# Patient Record
Sex: FEMALE | Race: WHITE | URBAN - METROPOLITAN AREA
[De-identification: names, ages, dates, MRNs, and addresses within clinical notes are randomized per-mention and may not be internally consistent; named-entity substitution may affect disease eponyms.]

---

## 2022-04-13 ENCOUNTER — OUTPATIENT (OUTPATIENT)
Dept: OUTPATIENT SERVICES | Facility: HOSPITAL | Age: 62
LOS: 1 days | Discharge: HOME | End: 2022-04-13
Payer: COMMERCIAL

## 2022-04-13 DIAGNOSIS — I77.9 DISORDER OF ARTERIES AND ARTERIOLES, UNSPECIFIED: ICD-10-CM

## 2022-04-13 PROCEDURE — 93925 LOWER EXTREMITY STUDY: CPT | Mod: 26

## 2022-06-24 ENCOUNTER — INPATIENT (INPATIENT)
Facility: HOSPITAL | Age: 62
LOS: 1 days | Discharge: HOME | End: 2022-06-26
Attending: INTERNAL MEDICINE | Admitting: INTERNAL MEDICINE
Payer: COMMERCIAL

## 2022-06-24 VITALS
OXYGEN SATURATION: 97 % | SYSTOLIC BLOOD PRESSURE: 176 MMHG | HEIGHT: 60 IN | RESPIRATION RATE: 19 BRPM | WEIGHT: 145.06 LBS | HEART RATE: 78 BPM | DIASTOLIC BLOOD PRESSURE: 89 MMHG | TEMPERATURE: 98 F

## 2022-06-24 DIAGNOSIS — Z98.890 OTHER SPECIFIED POSTPROCEDURAL STATES: Chronic | ICD-10-CM

## 2022-06-24 LAB
ALBUMIN SERPL ELPH-MCNC: 4.3 G/DL — SIGNIFICANT CHANGE UP (ref 3.5–5.2)
ALP SERPL-CCNC: 113 U/L — SIGNIFICANT CHANGE UP (ref 30–115)
ALT FLD-CCNC: 25 U/L — SIGNIFICANT CHANGE UP (ref 0–41)
ANION GAP SERPL CALC-SCNC: 10 MMOL/L — SIGNIFICANT CHANGE UP (ref 7–14)
AST SERPL-CCNC: 47 U/L — HIGH (ref 0–41)
BASOPHILS # BLD AUTO: 0.05 K/UL — SIGNIFICANT CHANGE UP (ref 0–0.2)
BASOPHILS NFR BLD AUTO: 0.6 % — SIGNIFICANT CHANGE UP (ref 0–1)
BILIRUB SERPL-MCNC: 0.3 MG/DL — SIGNIFICANT CHANGE UP (ref 0.2–1.2)
BUN SERPL-MCNC: 12 MG/DL — SIGNIFICANT CHANGE UP (ref 10–20)
CALCIUM SERPL-MCNC: 11.2 MG/DL — HIGH (ref 8.5–10.1)
CHLORIDE SERPL-SCNC: 103 MMOL/L — SIGNIFICANT CHANGE UP (ref 98–110)
CO2 SERPL-SCNC: 25 MMOL/L — SIGNIFICANT CHANGE UP (ref 17–32)
CREAT SERPL-MCNC: 0.7 MG/DL — SIGNIFICANT CHANGE UP (ref 0.7–1.5)
EGFR: 99 ML/MIN/1.73M2 — SIGNIFICANT CHANGE UP
EOSINOPHIL # BLD AUTO: 0.13 K/UL — SIGNIFICANT CHANGE UP (ref 0–0.7)
EOSINOPHIL NFR BLD AUTO: 1.5 % — SIGNIFICANT CHANGE UP (ref 0–8)
GLUCOSE SERPL-MCNC: 155 MG/DL — HIGH (ref 70–99)
HCT VFR BLD CALC: 44.7 % — SIGNIFICANT CHANGE UP (ref 37–47)
HGB BLD-MCNC: 15.5 G/DL — SIGNIFICANT CHANGE UP (ref 12–16)
IMM GRANULOCYTES NFR BLD AUTO: 0.6 % — HIGH (ref 0.1–0.3)
LYMPHOCYTES # BLD AUTO: 1.79 K/UL — SIGNIFICANT CHANGE UP (ref 1.2–3.4)
LYMPHOCYTES # BLD AUTO: 20.4 % — LOW (ref 20.5–51.1)
MAGNESIUM SERPL-MCNC: 2 MG/DL — SIGNIFICANT CHANGE UP (ref 1.8–2.4)
MCHC RBC-ENTMCNC: 29.6 PG — SIGNIFICANT CHANGE UP (ref 27–31)
MCHC RBC-ENTMCNC: 34.7 G/DL — SIGNIFICANT CHANGE UP (ref 32–37)
MCV RBC AUTO: 85.5 FL — SIGNIFICANT CHANGE UP (ref 81–99)
MONOCYTES # BLD AUTO: 0.6 K/UL — SIGNIFICANT CHANGE UP (ref 0.1–0.6)
MONOCYTES NFR BLD AUTO: 6.8 % — SIGNIFICANT CHANGE UP (ref 1.7–9.3)
NEUTROPHILS # BLD AUTO: 6.14 K/UL — SIGNIFICANT CHANGE UP (ref 1.4–6.5)
NEUTROPHILS NFR BLD AUTO: 70.1 % — SIGNIFICANT CHANGE UP (ref 42.2–75.2)
NRBC # BLD: 0 /100 WBCS — SIGNIFICANT CHANGE UP (ref 0–0)
NT-PROBNP SERPL-SCNC: 2648 PG/ML — HIGH (ref 0–300)
PLATELET # BLD AUTO: 277 K/UL — SIGNIFICANT CHANGE UP (ref 130–400)
POTASSIUM SERPL-MCNC: 4.5 MMOL/L — SIGNIFICANT CHANGE UP (ref 3.5–5)
POTASSIUM SERPL-SCNC: 4.5 MMOL/L — SIGNIFICANT CHANGE UP (ref 3.5–5)
PROT SERPL-MCNC: 7.5 G/DL — SIGNIFICANT CHANGE UP (ref 6–8)
RAPID RVP RESULT: SIGNIFICANT CHANGE UP
RBC # BLD: 5.23 M/UL — SIGNIFICANT CHANGE UP (ref 4.2–5.4)
RBC # FLD: 12.9 % — SIGNIFICANT CHANGE UP (ref 11.5–14.5)
SARS-COV-2 RNA SPEC QL NAA+PROBE: SIGNIFICANT CHANGE UP
SODIUM SERPL-SCNC: 138 MMOL/L — SIGNIFICANT CHANGE UP (ref 135–146)
TROPONIN T SERPL-MCNC: 0.22 NG/ML — CRITICAL HIGH
WBC # BLD: 8.76 K/UL — SIGNIFICANT CHANGE UP (ref 4.8–10.8)
WBC # FLD AUTO: 8.76 K/UL — SIGNIFICANT CHANGE UP (ref 4.8–10.8)

## 2022-06-24 PROCEDURE — 99284 EMERGENCY DEPT VISIT MOD MDM: CPT | Mod: 25

## 2022-06-24 PROCEDURE — 93010 ELECTROCARDIOGRAM REPORT: CPT

## 2022-06-24 PROCEDURE — 71046 X-RAY EXAM CHEST 2 VIEWS: CPT | Mod: 26

## 2022-06-24 RX ORDER — FAMOTIDINE 10 MG/ML
20 INJECTION INTRAVENOUS ONCE
Refills: 0 | Status: COMPLETED | OUTPATIENT
Start: 2022-06-24 | End: 2022-06-24

## 2022-06-24 RX ORDER — NITROGLYCERIN 6.5 MG
0.4 CAPSULE, EXTENDED RELEASE ORAL
Refills: 0 | Status: DISCONTINUED | OUTPATIENT
Start: 2022-06-24 | End: 2022-06-26

## 2022-06-24 RX ADMIN — Medication 0.4 MILLIGRAM(S): at 21:47

## 2022-06-24 RX ADMIN — Medication 30 MILLILITER(S): at 21:46

## 2022-06-24 RX ADMIN — FAMOTIDINE 104 MILLIGRAM(S): 10 INJECTION INTRAVENOUS at 22:40

## 2022-06-24 NOTE — ED PROVIDER NOTE - OBJECTIVE STATEMENT
Pt is a 62 yo F h/o PVD sp LLE bypass, HTN, daily smoker.  Presenting for chest pain since yesterday.  Pain initially began as epigastric described as burning, not relieved with OTC GERD medications.  Today it has progressed and became substernal, radiating to LUE, described as pressure, exertional feeling like someone is sitting on top of her. No prior stress testing. +SOB, no diaphoresis, +vomiting x1. Went to , and got ASA x4. No previous cath/stress/echo. Non-pleuritic; no immobilization or recent surgery, personal or family h/o VTE, hemoptysis, malignancy, or hormone use. No fever/chills, rhinorrhea, sore throat, cough. No palpitations. No other complaints - no abd pain, diarrhea, dysuria, hematuria, new joint pain, FND, rash, trauma.

## 2022-06-24 NOTE — ED PROVIDER NOTE - PHYSICAL EXAMINATION
_  Vital signs reviewed; ABCs intact  GENERAL: Well nourished, comfortable  SKIN: Warm, dry  HEAD & NECK: NCAT, supple neck  EYES: EOMI, PER B/L  ENT: MMM  CARD: RRR, S1, S2; no murmurs, no rubs, no gallops  RESP: Normal respiratory effort, +decreased breath sounds bilaterally  ABD: Soft, ND, NT, no rebound, no guarding  EXT: No edema; pulses palpable distally; no calf tenderness; symmetrical calf circumferences  NEUROMSK: Grossly intact  PSYCH: AAOx3, cooperative, appropriate

## 2022-06-24 NOTE — ED PROVIDER NOTE - PROGRESS NOTE DETAILS
Resident AO: Troponin elevated to 0.22, Cardiology consulted (spoke with Dr. Alvarenga), who will come and evaluate the patient. Patient already received ASA x 4 at  prior to arrival. Feels comfortable s/p SL nitro. Will repeat ECG. CO- pt endorsed to Dr. Blayne arriaga elevated, pt already took asa PTA.  rpt ekg w/o CINDY or STD. pt w/o active CP. cards consulted and pending eval. Resident AO: Spoke with Cardio Fellow Dr. Alvarenga, patient to be admitted under Dr. Magalie KHALIL after midnight for possible cath tomorrow, trops to be repeated, and Hep drip if pain returns/worsens. Pending sign-out to the MAR.

## 2022-06-24 NOTE — ED PROVIDER NOTE - ATTENDING CONTRIBUTION TO CARE
70 yo f hx HTN, PAD  pt presents for eval of cp. pain began yesterday and burning pain. today, pain changed and is described as substernal pressure, worse w/ exertion w/ associated sob. + radiation to L arm.  + nausea and vomiting x1. no hx pt/dvt. + smoker. no previous cardiac w/u     vss  gen- NAD, aaox3  card-rrr  lungs-ctab, no wheezing or rhonchi  abd-sntnd, no guarding or rebound  neuro- full str/sensation, cn ii-xii grossly intact, normal coordination     c/f acs, will get labs, ekg, trop, cxr  likely med tele admission

## 2022-06-24 NOTE — ED ADULT NURSE NOTE - OBJECTIVE STATEMENT
Patient presents to ED with complaints of epigastric pain radiating to the chest pain since yesterday.  Pain described as burning in nature, unrelieved with OTC  medications. Denies any PMH.

## 2022-06-24 NOTE — ED PROVIDER NOTE - CLINICAL SUMMARY MEDICAL DECISION MAKING FREE TEXT BOX
pt s/o to me from Dr. Zhou- pt presenting with chest pain x2d, found to have NSTEMI. no active chest pain at this time. dw cardiology, plan for admit, cath

## 2022-06-25 LAB
A1C WITH ESTIMATED AVERAGE GLUCOSE RESULT: 6.5 % — HIGH (ref 4–5.6)
ALBUMIN SERPL ELPH-MCNC: 3.8 G/DL — SIGNIFICANT CHANGE UP (ref 3.5–5.2)
ALP SERPL-CCNC: 102 U/L — SIGNIFICANT CHANGE UP (ref 30–115)
ALT FLD-CCNC: 23 U/L — SIGNIFICANT CHANGE UP (ref 0–41)
ANION GAP SERPL CALC-SCNC: 10 MMOL/L — SIGNIFICANT CHANGE UP (ref 7–14)
APTT BLD: 64.4 SEC — HIGH (ref 27–39.2)
AST SERPL-CCNC: 49 U/L — HIGH (ref 0–41)
BASOPHILS # BLD AUTO: 0.05 K/UL — SIGNIFICANT CHANGE UP (ref 0–0.2)
BASOPHILS NFR BLD AUTO: 0.8 % — SIGNIFICANT CHANGE UP (ref 0–1)
BILIRUB SERPL-MCNC: 0.3 MG/DL — SIGNIFICANT CHANGE UP (ref 0.2–1.2)
BLD GP AB SCN SERPL QL: SIGNIFICANT CHANGE UP
BUN SERPL-MCNC: 11 MG/DL — SIGNIFICANT CHANGE UP (ref 10–20)
CALCIUM SERPL-MCNC: 10.3 MG/DL — HIGH (ref 8.5–10.1)
CHLORIDE SERPL-SCNC: 105 MMOL/L — SIGNIFICANT CHANGE UP (ref 98–110)
CHOLEST SERPL-MCNC: 236 MG/DL — HIGH
CO2 SERPL-SCNC: 24 MMOL/L — SIGNIFICANT CHANGE UP (ref 17–32)
CREAT SERPL-MCNC: 0.7 MG/DL — SIGNIFICANT CHANGE UP (ref 0.7–1.5)
EGFR: 99 ML/MIN/1.73M2 — SIGNIFICANT CHANGE UP
EOSINOPHIL # BLD AUTO: 0.15 K/UL — SIGNIFICANT CHANGE UP (ref 0–0.7)
EOSINOPHIL NFR BLD AUTO: 2.3 % — SIGNIFICANT CHANGE UP (ref 0–8)
ESTIMATED AVERAGE GLUCOSE: 140 MG/DL — HIGH (ref 68–114)
GLUCOSE SERPL-MCNC: 116 MG/DL — HIGH (ref 70–99)
HCT VFR BLD CALC: 42.5 % — SIGNIFICANT CHANGE UP (ref 37–47)
HDLC SERPL-MCNC: 33 MG/DL — LOW
HGB BLD-MCNC: 14.6 G/DL — SIGNIFICANT CHANGE UP (ref 12–16)
IMM GRANULOCYTES NFR BLD AUTO: 0.5 % — HIGH (ref 0.1–0.3)
INR BLD: 1.08 RATIO — SIGNIFICANT CHANGE UP (ref 0.65–1.3)
LIPID PNL WITH DIRECT LDL SERPL: 159 MG/DL — HIGH
LYMPHOCYTES # BLD AUTO: 1.91 K/UL — SIGNIFICANT CHANGE UP (ref 1.2–3.4)
LYMPHOCYTES # BLD AUTO: 28.8 % — SIGNIFICANT CHANGE UP (ref 20.5–51.1)
MAGNESIUM SERPL-MCNC: 2.1 MG/DL — SIGNIFICANT CHANGE UP (ref 1.8–2.4)
MCHC RBC-ENTMCNC: 29.5 PG — SIGNIFICANT CHANGE UP (ref 27–31)
MCHC RBC-ENTMCNC: 34.4 G/DL — SIGNIFICANT CHANGE UP (ref 32–37)
MCV RBC AUTO: 85.9 FL — SIGNIFICANT CHANGE UP (ref 81–99)
MONOCYTES # BLD AUTO: 0.49 K/UL — SIGNIFICANT CHANGE UP (ref 0.1–0.6)
MONOCYTES NFR BLD AUTO: 7.4 % — SIGNIFICANT CHANGE UP (ref 1.7–9.3)
NEUTROPHILS # BLD AUTO: 4.01 K/UL — SIGNIFICANT CHANGE UP (ref 1.4–6.5)
NEUTROPHILS NFR BLD AUTO: 60.2 % — SIGNIFICANT CHANGE UP (ref 42.2–75.2)
NON HDL CHOLESTEROL: 203 MG/DL — HIGH
NRBC # BLD: 0 /100 WBCS — SIGNIFICANT CHANGE UP (ref 0–0)
PHOSPHATE SERPL-MCNC: 3 MG/DL — SIGNIFICANT CHANGE UP (ref 2.1–4.9)
PLATELET # BLD AUTO: 259 K/UL — SIGNIFICANT CHANGE UP (ref 130–400)
POTASSIUM SERPL-MCNC: 3.8 MMOL/L — SIGNIFICANT CHANGE UP (ref 3.5–5)
POTASSIUM SERPL-SCNC: 3.8 MMOL/L — SIGNIFICANT CHANGE UP (ref 3.5–5)
PROT SERPL-MCNC: 6.1 G/DL — SIGNIFICANT CHANGE UP (ref 6–8)
PROTHROM AB SERPL-ACNC: 12.4 SEC — SIGNIFICANT CHANGE UP (ref 9.95–12.87)
RBC # BLD: 4.95 M/UL — SIGNIFICANT CHANGE UP (ref 4.2–5.4)
RBC # FLD: 13 % — SIGNIFICANT CHANGE UP (ref 11.5–14.5)
SODIUM SERPL-SCNC: 139 MMOL/L — SIGNIFICANT CHANGE UP (ref 135–146)
TRIGL SERPL-MCNC: 220 MG/DL — HIGH
TROPONIN T SERPL-MCNC: 0.36 NG/ML — CRITICAL HIGH
TROPONIN T SERPL-MCNC: 0.39 NG/ML — CRITICAL HIGH
TSH SERPL-MCNC: 2.21 UIU/ML — SIGNIFICANT CHANGE UP (ref 0.27–4.2)
WBC # BLD: 6.64 K/UL — SIGNIFICANT CHANGE UP (ref 4.8–10.8)
WBC # FLD AUTO: 6.64 K/UL — SIGNIFICANT CHANGE UP (ref 4.8–10.8)

## 2022-06-25 PROCEDURE — 92928 PRQ TCAT PLMT NTRAC ST 1 LES: CPT | Mod: LC

## 2022-06-25 PROCEDURE — 99222 1ST HOSP IP/OBS MODERATE 55: CPT | Mod: 57

## 2022-06-25 PROCEDURE — 93306 TTE W/DOPPLER COMPLETE: CPT | Mod: 26

## 2022-06-25 PROCEDURE — 93010 ELECTROCARDIOGRAM REPORT: CPT | Mod: 76

## 2022-06-25 PROCEDURE — 93458 L HRT ARTERY/VENTRICLE ANGIO: CPT | Mod: 26,XU

## 2022-06-25 RX ORDER — SODIUM CHLORIDE 9 MG/ML
250 INJECTION INTRAMUSCULAR; INTRAVENOUS; SUBCUTANEOUS ONCE
Refills: 0 | Status: COMPLETED | OUTPATIENT
Start: 2022-06-25 | End: 2022-06-25

## 2022-06-25 RX ORDER — ASPIRIN/CALCIUM CARB/MAGNESIUM 324 MG
81 TABLET ORAL DAILY
Refills: 0 | Status: DISCONTINUED | OUTPATIENT
Start: 2022-06-25 | End: 2022-06-26

## 2022-06-25 RX ORDER — SODIUM CHLORIDE 9 MG/ML
1000 INJECTION INTRAMUSCULAR; INTRAVENOUS; SUBCUTANEOUS
Refills: 0 | Status: DISCONTINUED | OUTPATIENT
Start: 2022-06-25 | End: 2022-06-26

## 2022-06-25 RX ORDER — LANOLIN ALCOHOL/MO/W.PET/CERES
3 CREAM (GRAM) TOPICAL AT BEDTIME
Refills: 0 | Status: DISCONTINUED | OUTPATIENT
Start: 2022-06-25 | End: 2022-06-26

## 2022-06-25 RX ORDER — CLOPIDOGREL BISULFATE 75 MG/1
75 TABLET, FILM COATED ORAL DAILY
Refills: 0 | Status: DISCONTINUED | OUTPATIENT
Start: 2022-06-25 | End: 2022-06-25

## 2022-06-25 RX ORDER — TICAGRELOR 90 MG/1
90 TABLET ORAL EVERY 12 HOURS
Refills: 0 | Status: DISCONTINUED | OUTPATIENT
Start: 2022-06-25 | End: 2022-06-25

## 2022-06-25 RX ORDER — TICAGRELOR 90 MG/1
1 TABLET ORAL
Qty: 60 | Refills: 2
Start: 2022-06-25 | End: 2022-09-22

## 2022-06-25 RX ORDER — METOPROLOL TARTRATE 50 MG
12.5 TABLET ORAL
Refills: 0 | Status: DISCONTINUED | OUTPATIENT
Start: 2022-06-25 | End: 2022-06-26

## 2022-06-25 RX ORDER — ACETAMINOPHEN 500 MG
650 TABLET ORAL EVERY 6 HOURS
Refills: 0 | Status: DISCONTINUED | OUTPATIENT
Start: 2022-06-25 | End: 2022-06-26

## 2022-06-25 RX ORDER — CLOPIDOGREL BISULFATE 75 MG/1
75 TABLET, FILM COATED ORAL DAILY
Refills: 0 | Status: DISCONTINUED | OUTPATIENT
Start: 2022-06-26 | End: 2022-06-26

## 2022-06-25 RX ORDER — ENOXAPARIN SODIUM 100 MG/ML
40 INJECTION SUBCUTANEOUS EVERY 24 HOURS
Refills: 0 | Status: DISCONTINUED | OUTPATIENT
Start: 2022-06-25 | End: 2022-06-26

## 2022-06-25 RX ORDER — SODIUM CHLORIDE 9 MG/ML
1000 INJECTION INTRAMUSCULAR; INTRAVENOUS; SUBCUTANEOUS
Refills: 0 | Status: DISCONTINUED | OUTPATIENT
Start: 2022-06-25 | End: 2022-06-25

## 2022-06-25 RX ORDER — ATORVASTATIN CALCIUM 80 MG/1
80 TABLET, FILM COATED ORAL AT BEDTIME
Refills: 0 | Status: DISCONTINUED | OUTPATIENT
Start: 2022-06-25 | End: 2022-06-26

## 2022-06-25 RX ORDER — AMLODIPINE BESYLATE 2.5 MG/1
10 TABLET ORAL DAILY
Refills: 0 | Status: DISCONTINUED | OUTPATIENT
Start: 2022-06-25 | End: 2022-06-26

## 2022-06-25 RX ORDER — INFLUENZA VIRUS VACCINE 15; 15; 15; 15 UG/.5ML; UG/.5ML; UG/.5ML; UG/.5ML
0.5 SUSPENSION INTRAMUSCULAR ONCE
Refills: 0 | Status: DISCONTINUED | OUTPATIENT
Start: 2022-06-25 | End: 2022-06-26

## 2022-06-25 RX ORDER — HYDROCHLOROTHIAZIDE 25 MG
12.5 TABLET ORAL DAILY
Refills: 0 | Status: DISCONTINUED | OUTPATIENT
Start: 2022-06-25 | End: 2022-06-25

## 2022-06-25 RX ADMIN — Medication 650 MILLIGRAM(S): at 02:04

## 2022-06-25 RX ADMIN — Medication 30 MILLILITER(S): at 18:54

## 2022-06-25 RX ADMIN — SODIUM CHLORIDE 100 MILLILITER(S): 9 INJECTION INTRAMUSCULAR; INTRAVENOUS; SUBCUTANEOUS at 14:15

## 2022-06-25 RX ADMIN — Medication 30 MILLILITER(S): at 21:01

## 2022-06-25 RX ADMIN — Medication 30 MILLILITER(S): at 14:35

## 2022-06-25 RX ADMIN — Medication 30 MILLILITER(S): at 11:02

## 2022-06-25 RX ADMIN — Medication 12.5 MILLIGRAM(S): at 11:02

## 2022-06-25 RX ADMIN — SODIUM CHLORIDE 50 MILLILITER(S): 9 INJECTION INTRAMUSCULAR; INTRAVENOUS; SUBCUTANEOUS at 11:46

## 2022-06-25 RX ADMIN — ATORVASTATIN CALCIUM 80 MILLIGRAM(S): 80 TABLET, FILM COATED ORAL at 21:01

## 2022-06-25 RX ADMIN — Medication 650 MILLIGRAM(S): at 01:30

## 2022-06-25 RX ADMIN — SODIUM CHLORIDE 1000 MILLILITER(S): 9 INJECTION INTRAMUSCULAR; INTRAVENOUS; SUBCUTANEOUS at 11:10

## 2022-06-25 RX ADMIN — CLOPIDOGREL BISULFATE 75 MILLIGRAM(S): 75 TABLET, FILM COATED ORAL at 11:02

## 2022-06-25 RX ADMIN — ATORVASTATIN CALCIUM 80 MILLIGRAM(S): 80 TABLET, FILM COATED ORAL at 05:32

## 2022-06-25 RX ADMIN — AMLODIPINE BESYLATE 10 MILLIGRAM(S): 2.5 TABLET ORAL at 05:32

## 2022-06-25 RX ADMIN — Medication 12.5 MILLIGRAM(S): at 05:32

## 2022-06-25 RX ADMIN — Medication 3 MILLIGRAM(S): at 21:00

## 2022-06-25 RX ADMIN — ENOXAPARIN SODIUM 40 MILLIGRAM(S): 100 INJECTION SUBCUTANEOUS at 05:32

## 2022-06-25 RX ADMIN — Medication 30 MILLILITER(S): at 05:34

## 2022-06-25 RX ADMIN — Medication 12.5 MILLIGRAM(S): at 18:53

## 2022-06-25 NOTE — PROGRESS NOTE ADULT - SUBJECTIVE AND OBJECTIVE BOX
SUBJECTIVE:    Patient is a 61y old Female who presents with a chief complaint of NSTEMI (25 Jun 2022 02:04)    Currently admitted to medicine with the primary diagnosis of Non-ST elevation MI (NSTEMI)       Today is hospital day 1d. This morning she is resting comfortably in bed and reports no new issues or overnight events.     PAST MEDICAL & SURGICAL HISTORY  HTN (hypertension)    PAD (peripheral artery disease)    Status post femorotibial bypass      SOCIAL HISTORY:  Negative for smoking/alcohol/drug use.     ALLERGIES:  No Known Allergies    MEDICATIONS:  STANDING MEDICATIONS  aluminum hydroxide/magnesium hydroxide/simethicone Suspension 30 milliLiter(s) Oral every 4 hours  amLODIPine   Tablet 10 milliGRAM(s) Oral daily  aspirin  chewable 81 milliGRAM(s) Oral daily  atorvastatin 80 milliGRAM(s) Oral at bedtime  enoxaparin Injectable 40 milliGRAM(s) SubCutaneous every 24 hours  influenza   Vaccine 0.5 milliLiter(s) IntraMuscular once  metoprolol tartrate 12.5 milliGRAM(s) Oral two times a day  sodium chloride 0.9%. 1000 milliLiter(s) IV Continuous <Continuous>  ticagrelor 90 milliGRAM(s) Oral every 12 hours    PRN MEDICATIONS  acetaminophen     Tablet .. 650 milliGRAM(s) Oral every 6 hours PRN  nitroglycerin     SubLingual 0.4 milliGRAM(s) SubLingual every 5 minutes PRN    VITALS:   T(F): 96.5  HR: 70  BP: 123/63  RR: 16  SpO2: 97%    LABS:                        14.6   6.64  )-----------( 259      ( 25 Jun 2022 06:49 )             42.5     06-25    139  |  105  |  11  ----------------------------<  116<H>  3.8   |  24  |  0.7    Ca    10.3<H>      25 Jun 2022 06:49  Phos  3.0     06-25  Mg     2.1     06-25    TPro  6.1  /  Alb  3.8  /  TBili  0.3  /  DBili  x   /  AST  49<H>  /  ALT  23  /  AlkPhos  102  06-25    PT/INR - ( 25 Jun 2022 06:49 )   PT: 12.40 sec;   INR: 1.08 ratio         PTT - ( 25 Jun 2022 06:49 )  PTT:64.4 sec      Troponin T, Serum: 0.39 ng/mL *HH* (06-25-22 @ 11:17)  Troponin T, Serum: 0.36 ng/mL *HH* (06-25-22 @ 06:49)  Troponin T, Serum: 0.22 ng/mL *HH* (06-24-22 @ 21:55)      CARDIAC MARKERS ( 25 Jun 2022 11:17 )  x     / 0.39 ng/mL / x     / x     / x      CARDIAC MARKERS ( 25 Jun 2022 06:49 )  x     / 0.36 ng/mL / x     / x     / x      CARDIAC MARKERS ( 24 Jun 2022 21:55 )  x     / 0.22 ng/mL / x     / x     / x          RADIOLOGY:    PHYSICAL EXAM:  GEN: No acute distress  LUNGS: Clear to auscultation bilaterally   HEART: Regular  ABD: Soft, non-tender, non-distended.  EXT: NC/NC/NE/2+PP/FAULKNER/Skin Intact.   NEURO: AAOX3    Intravenous access:   NG tube:   Shipley Catheter:     Echocardio:  1. Left ventricular ejection fraction, by visualestimation, is 50 to   55%.   2. Endocardial visualization was enhanced with intravenous echo contrast.   3. No evidence of LV thrombus.   4. Basal and mid anterolateral wall, basal and mid anterior wall, and   basal and mid inferolateral wall are abnormal as described above.   5. Spectral Doppler shows impaired relaxation pattern of left   ventricular myocardial filling (Grade I diastolic dysfunction).   6. Mild thickening and calcification of the anterior and posterior   mitral valve leaflets.   7. Mild to moderate mitral annular calcification.   8. Moderate mitral valve regurgitation.   9. Severity of MR may be underestimated due to eccentric direction of   the jet.    Cardiac cath:  Left main: Normal.     LAD: The vessel was large sized. Angiography showed moderate atherosclerosis with no clinical lesions appreciated.     1st diagonal: The vessel was very small sized. Angiography showed mild atherosclerosis with no flow limiting lesions.     1st septal: Normal. The vessel was medium sized.     Circumflex: The vessel was large sized. Proximal circumflex: There was a diffuse 100 % stenosis in the proximal third of the vessel segment. There was KEN grade 0 flow through the vessel (no flow). This is a likely culprit for the patient's clinical presentation. An intervention was performed. Ramus intermedius: The vessel was medium to large sized. Angiography showed mild atherosclerosis with no flow limiting lesions.     RCA: The vessel was medium sized. Proximal RCA: There was a diffuse 100 % stenosis. There was KEN grade 0 flow through the vessel (no flow). This lesion is a chronic total occlusion. Mid RCA: Supplied by collaterals from both right and left systems.   Left main: Normal.   LAD: The vessel was large sized. Angiography showed moderate atherosclerosis with no clinical lesions appreciated.   1st diagonal: The vessel was very small sized. Angiography showed mild atherosclerosis with no flow limiting lesions.   1st septal: Normal. The vessel was medium sized.   Circumflex: The vessel was large sized. Proximal circumflex: There was a diffuse 100 % stenosis in the proximal third of the vessel segment. There was KEN grade 0 flow through the vessel (no flow). This is a likely culprit for the patient's clinical presentation. An intervention was performed. Ramus intermedius: The vessel was medium to large sized. Angiography showed mild atherosclerosis with no flow limiting lesions.   
INTERVENTIONAL CARDIOLOGY NP:    Brilinta copay confirmed, $15/month copay, pt aware and agreeable, RX available for pickup

## 2022-06-25 NOTE — H&P ADULT - HISTORY OF PRESENT ILLNESS
Pt is a 60 yo F h/o PVD sp LLE bypass, HTN, presentes to the ED for chest pain. On evening of 6/23 developed epigastric pain after dinner, burning in nature, did not improve with antacids. Pain progressed and then presented as chest pain, became progressively worse, described as substernal pressure like someone was sitting on her, w/ radiation to LUE. Associated with shortness of breath and 1 episode of vomiting. Initially went to urgent care, EKG was normal, given aspirin sent to the ED. Patient does not see cardiologist. No previous cath, stress, echo. States she smokes  cigarettes 1 PPD.  Denies palpitations, diaphoresis, fevers/chills, numbness, tingling headache, diarrhea, constipation.     In the ED vitals stable. Troponin elevated to 0.22, BNP 2648. Cardiology consulted. Given SL nitro with improvement of symptoms. EKG NSR w/o CINDY or STD. Cardio fellow evaluated patient. Plan to be admit to cards tele under Dr. Mejias. NPO after midnight for possible cath tomorrow, trops to be repeated, and Hep drip if pain returns/worsens.     
Yes

## 2022-06-25 NOTE — H&P ADULT - ATTENDING COMMENTS
pt seen and examined  events noted, chart reviewed  ACS  H/O PAD/HTN/Dyslipidemia  active smoker  cp free this am  for cath today  2d echo  dapt/high dose statin, bp mx  further recom after cath  smoking cessation

## 2022-06-25 NOTE — H&P ADULT - NSHPPHYSICALEXAM_GEN_ALL_CORE
VITALS:   T(C): 36.3 (06-25-22 @ 01:01), Max: 37 (06-25-22 @ 00:04)  HR: 87 (06-25-22 @ 01:01) (72 - 87)  BP: 140/87 (06-25-22 @ 01:01) (129/67 - 176/89)  RR: 18 (06-25-22 @ 01:17) (18 - 19)  SpO2: 97% (06-25-22 @ 01:17) (97% - 98%)    GENERAL: NAD, lying in bed comfortably  HEAD:  Atraumatic, normocephalic  EYES: EOMI, PERRLA, conjunctiva and sclera clear  ENT: Moist mucous membranes  NECK: Supple, no JVD  HEART: Regular rate and rhythm, no murmurs, rubs, or gallops  LUNGS: Unlabored respirations.  Clear to auscultation bilaterally, no crackles, wheezing, or rhonchi  ABDOMEN: Soft, nontender, nondistended, +BS  EXTREMITIES: 2+ peripheral pulses bilaterally. No clubbing, cyanosis, or edema  NERVOUS SYSTEM:  A&Ox3, no focal deficits   SKIN: No rashes or lesions

## 2022-06-25 NOTE — PATIENT PROFILE ADULT - FALL HARM RISK - HARM RISK INTERVENTIONS

## 2022-06-25 NOTE — CHART NOTE - NSCHARTNOTEFT_GEN_A_CORE
PRE-OP DIAGNOSIS:    ACS - NSTEMI/UA        PROCEDURE:     [x] Coronary Angiogram   [x] LHC   [ ] RHC   [x ] Intervention (see below)         PHYSICIAN:  DR CHOI    ASSISTANT:   Dr Rocha       Consent:    [x] Patient   [ ] Family Member   [ ]  Used        Anesthesia:   [ ] General   [x] Sedation   [x] Local        Access & Closure:     [x] Fr Radial Artery  / TR BAND      IV Contrast:    130     mL        Intervention:     PTCA.   Coronary Stent Placement.     Implants:    3.0 X 20 Synergy XD drug-eluting stent     FINDINGS:     The coronary circulation is left dominant.     There was significant 2-vessel coronary artery disease (RCA and circumflex).     Left main: Normal.     LAD: The vessel was large sized. Angiography showed moderate atherosclerosis with no clinical lesions appreciated.     1st diagonal: The vessel was very small sized. Angiography showed mild atherosclerosis with no flow limiting lesions.     1st septal: Normal. The vessel was medium sized.     Circumflex: The vessel was large sized. Proximal circumflex: There was a diffuse 100 % stenosis in the proximal third of the vessel segment. There was KEN grade 0 flow through the vessel (no flow). This is a likely culprit for the patient's clinical presentation. An intervention was performed. Ramus intermedius: The vessel was medium to large sized. Angiography showed mild atherosclerosis with no flow limiting lesions.     RCA: The vessel was medium sized. Proximal RCA: There was a diffuse 100 % stenosis. There was KEN grade 0 flow through the vessel (no flow). This lesion is a chronic total occlusion. Mid RCA: Supplied by collaterals from both right and left systems.      LVEDP:     13     mmHg     EF:       %        ESTIMATED BLOOD LOSS: < 10 mL        CONDITION:   [x] Good   [] Fair   [] Critical        SPECIMEN REMOVED: N/A       POST-OP DIAGNOSIS:      [x] Significant  2     Vessel Coronary Artery Disease (RCA, LCX) - SP PCI/STENT TO PROX LCX.   [x] AUC score:  7              PLAN OF CARE:     [] Return to In-patient bed  [] Medications:  asa / brilinta (loaded with asa / plavix pre-procedure; loaded with brilinta post procedure) - next dose brilinta tonight.   [] continue lopressor and statin.   [] IV Fluids:   100CC/H X 6H.    [] CARDIAC FELLOW TO REMOVE TR BAND ~4:30-5PM. PRE-OP DIAGNOSIS:    ACS - NSTEMI/UA        PROCEDURE:     [x] Coronary Angiogram   [x] LHC   [ ] RHC   [x ] Intervention (see below)         PHYSICIAN:  DR COHI    ASSISTANT:   Dr Rocha       Consent:    [x] Patient   [ ] Family Member   [ ]  Used        Anesthesia:   [ ] General   [x] Sedation   [x] Local        Access & Closure:     [x] Fr Radial Artery  / TR BAND      IV Contrast:    130     mL        Intervention:     PTCA.   Coronary Stent Placement.     Implants:    3.0 X 20 Synergy XD drug-eluting stent     FINDINGS:     The coronary circulation is left dominant.     There was significant 2-vessel coronary artery disease (RCA and circumflex).     Left main: Normal.     LAD: The vessel was large sized. Angiography showed moderate atherosclerosis with no clinical lesions appreciated.     1st diagonal: The vessel was very small sized. Angiography showed mild atherosclerosis with no flow limiting lesions.     1st septal: Normal. The vessel was medium sized.     Circumflex: The vessel was large sized. Proximal circumflex: There was a diffuse 100 % stenosis in the proximal third of the vessel segment. There was KEN grade 0 flow through the vessel (no flow). This is a likely culprit for the patient's clinical presentation. An intervention was performed. Ramus intermedius: The vessel was medium to large sized. Angiography showed mild atherosclerosis with no flow limiting lesions.     RCA: The vessel was medium sized. Proximal RCA: There was a diffuse 100 % stenosis. There was KEN grade 0 flow through the vessel (no flow). This lesion is a chronic total occlusion. Mid RCA: Supplied by collaterals from both right and left systems.      LVEDP:     13     mmHg     EF:       %        ESTIMATED BLOOD LOSS: < 10 mL        CONDITION:   [x] Good   [] Fair   [] Critical        SPECIMEN REMOVED: N/A       POST-OP DIAGNOSIS:      [x] Significant  2     Vessel Coronary Artery Disease (RCA, LCX) - SP PCI/STENT TO PROX LCX.   [x] AUC score:  8              PLAN OF CARE:     [] Return to In-patient bed  [] Medications:  asa / brilinta (loaded with asa / plavix pre-procedure; loaded with brilinta post procedure) - next dose brilinta tonight.   [] continue lopressor and statin.   [] IV Fluids:   100CC/H X 6H.    [] CARDIAC FELLOW TO REMOVE TR BAND ~4:30-5PM.

## 2022-06-25 NOTE — PROGRESS NOTE ADULT - ASSESSMENT
60 yo F h/o PVD sp LLE bypass, HTN, daily smoker, presents to the ED for ches pain found to have NSTEMI s/p PCI to LCx    #NSTEMI- CAD LCx Culprit vessel, RCA  s/p PCI to LCX  - continue DAPT  - Check access site for bleeding  - add metoprolol 12.5mg bid  - will consider  switching  to ACEI/ARB  c/w atorvastatin    #HTN   - c/w home amlodipine 10mg PO  - will consider switching to due ACEI/ARB    #Misc  - DVT Prophylaxis: lovenox  - GI Prophylaxis:not indicated  - Diet:NPO for now   - Activity:IAT  Dispo: cards tele

## 2022-06-25 NOTE — H&P ADULT - ASSESSMENT
Pt is a 62 yo F h/o PVD sp LLE bypass, HTN, daily smoker, presents to the ED for chest pain.     #NSTEMI   #PVD s/p LLE bypass  - Trop 0.22  - EKG: NSR w/o CINDY or STD  - CXR: no evidence of cardiopulmonary disease  - trend trops  - order TTE  - send TSH, lipid panel, A1C  - Chest pain resolved after SL nitro, hold off heparin ggt for now   - NPO for possible procedure   - resume home ASA and plavix  - start atorvastatin 80    #HTN - c/w home amlodipine 10mg PO and hydrochlorothiazide 12.5 mg PO    #Misc  - DVT Prophylaxis: lovenox  - GI Prophylaxis:not indicated  - Diet:NPO for now   - Activity:IAT  Dispo: cards tele

## 2022-06-25 NOTE — H&P ADULT - NSHPREVIEWOFSYSTEMS_GEN_ALL_CORE
REVIEW OF SYSTEMS:    CONSTITUTIONAL: No fever, weight loss, or fatigue  EYES: No eye pain, visual disturbances, or discharge  ENMT:  No difficulty hearing, tinnitus, vertigo; No sinus or throat pain  NECK: No pain or stiffness  BREASTS: No pain, masses, or nipple discharge  RESPIRATORY: No cough, wheezing, chills or hemoptysis; No shortness of breath  CARDIOVASCULAR: See HPI  GASTROINTESTINAL: No abdominal or epigastric pain. No nausea, vomiting, or hematemesis; No diarrhea or constipation. No melena or hematochezia.  GENITOURINARY: No dysuria, frequency, hematuria, or incontinence  NEUROLOGICAL: No headaches, memory loss, loss of strength, numbness, or tremors  SKIN: No itching, burning, rashes, or lesions   LYMPH NODES: No enlarged glands  ENDOCRINE: No heat or cold intolerance; No hair loss  MUSCULOSKELETAL: No joint pain or swelling; No muscle, back, or extremity pain  PSYCHIATRIC: No depression, anxiety, mood swings, or difficulty sleeping  HEME/LYMPH: No easy bruising, or bleeding gums  ALLERGY AND IMMUNOLOGIC: No hives or eczema

## 2022-06-25 NOTE — CHART NOTE - NSCHARTNOTEFT_GEN_A_CORE
INTERVENTIONAL CARDIOLOGY NP:    Brilinta copay confirmed, $15/month copay, pt aware and agreeable, RX available for pickup INTERVENTIONAL CARDIOLOGY NP:    Brilinta copay confirmed, $15/month copay, pt aware and agreeable, RX available for p/up

## 2022-06-25 NOTE — H&P ADULT - NSHPLABSRESULTS_GEN_ALL_CORE
LABS/RADIOLOGY RESULTS:                          15.5   8.76  )-----------( 277      ( 24 Jun 2022 21:55 )             44.7   06-24    138  |  103  |  12  ----------------------------<  155<H>  4.5   |  25  |  0.7    Ca    11.2<H>      24 Jun 2022 21:55  Mg     2.0     06-24    TPro  7.5  /  Alb  4.3  /  TBili  0.3  /  DBili  x   /  AST  47<H>  /  ALT  25  /  AlkPhos  113  06-24  Blood Cultures

## 2022-06-26 ENCOUNTER — TRANSCRIPTION ENCOUNTER (OUTPATIENT)
Age: 62
End: 2022-06-26

## 2022-06-26 VITALS — OXYGEN SATURATION: 97 %

## 2022-06-26 LAB
ALBUMIN SERPL ELPH-MCNC: 3.7 G/DL — SIGNIFICANT CHANGE UP (ref 3.5–5.2)
ALP SERPL-CCNC: 103 U/L — SIGNIFICANT CHANGE UP (ref 30–115)
ALT FLD-CCNC: 20 U/L — SIGNIFICANT CHANGE UP (ref 0–41)
ANION GAP SERPL CALC-SCNC: 11 MMOL/L — SIGNIFICANT CHANGE UP (ref 7–14)
AST SERPL-CCNC: 33 U/L — SIGNIFICANT CHANGE UP (ref 0–41)
BASOPHILS # BLD AUTO: 0.04 K/UL — SIGNIFICANT CHANGE UP (ref 0–0.2)
BASOPHILS NFR BLD AUTO: 0.6 % — SIGNIFICANT CHANGE UP (ref 0–1)
BILIRUB SERPL-MCNC: 0.4 MG/DL — SIGNIFICANT CHANGE UP (ref 0.2–1.2)
BUN SERPL-MCNC: 8 MG/DL — LOW (ref 10–20)
CALCIUM SERPL-MCNC: 9.3 MG/DL — SIGNIFICANT CHANGE UP (ref 8.5–10.1)
CHLORIDE SERPL-SCNC: 104 MMOL/L — SIGNIFICANT CHANGE UP (ref 98–110)
CO2 SERPL-SCNC: 23 MMOL/L — SIGNIFICANT CHANGE UP (ref 17–32)
CREAT SERPL-MCNC: 0.7 MG/DL — SIGNIFICANT CHANGE UP (ref 0.7–1.5)
EGFR: 99 ML/MIN/1.73M2 — SIGNIFICANT CHANGE UP
EOSINOPHIL # BLD AUTO: 0.16 K/UL — SIGNIFICANT CHANGE UP (ref 0–0.7)
EOSINOPHIL NFR BLD AUTO: 2.2 % — SIGNIFICANT CHANGE UP (ref 0–8)
GLUCOSE SERPL-MCNC: 117 MG/DL — HIGH (ref 70–99)
HCT VFR BLD CALC: 41.6 % — SIGNIFICANT CHANGE UP (ref 37–47)
HGB BLD-MCNC: 14.5 G/DL — SIGNIFICANT CHANGE UP (ref 12–16)
IMM GRANULOCYTES NFR BLD AUTO: 0.3 % — SIGNIFICANT CHANGE UP (ref 0.1–0.3)
LYMPHOCYTES # BLD AUTO: 1.6 K/UL — SIGNIFICANT CHANGE UP (ref 1.2–3.4)
LYMPHOCYTES # BLD AUTO: 22.1 % — SIGNIFICANT CHANGE UP (ref 20.5–51.1)
MAGNESIUM SERPL-MCNC: 2.4 MG/DL — SIGNIFICANT CHANGE UP (ref 1.8–2.4)
MCHC RBC-ENTMCNC: 30 PG — SIGNIFICANT CHANGE UP (ref 27–31)
MCHC RBC-ENTMCNC: 34.9 G/DL — SIGNIFICANT CHANGE UP (ref 32–37)
MCV RBC AUTO: 86.1 FL — SIGNIFICANT CHANGE UP (ref 81–99)
MONOCYTES # BLD AUTO: 0.55 K/UL — SIGNIFICANT CHANGE UP (ref 0.1–0.6)
MONOCYTES NFR BLD AUTO: 7.6 % — SIGNIFICANT CHANGE UP (ref 1.7–9.3)
NEUTROPHILS # BLD AUTO: 4.86 K/UL — SIGNIFICANT CHANGE UP (ref 1.4–6.5)
NEUTROPHILS NFR BLD AUTO: 67.2 % — SIGNIFICANT CHANGE UP (ref 42.2–75.2)
NRBC # BLD: 0 /100 WBCS — SIGNIFICANT CHANGE UP (ref 0–0)
PLATELET # BLD AUTO: 245 K/UL — SIGNIFICANT CHANGE UP (ref 130–400)
POTASSIUM SERPL-MCNC: 3.8 MMOL/L — SIGNIFICANT CHANGE UP (ref 3.5–5)
POTASSIUM SERPL-SCNC: 3.8 MMOL/L — SIGNIFICANT CHANGE UP (ref 3.5–5)
PROT SERPL-MCNC: 6.2 G/DL — SIGNIFICANT CHANGE UP (ref 6–8)
RBC # BLD: 4.83 M/UL — SIGNIFICANT CHANGE UP (ref 4.2–5.4)
RBC # FLD: 12.9 % — SIGNIFICANT CHANGE UP (ref 11.5–14.5)
SODIUM SERPL-SCNC: 138 MMOL/L — SIGNIFICANT CHANGE UP (ref 135–146)
TROPONIN T SERPL-MCNC: 0.67 NG/ML — CRITICAL HIGH
WBC # BLD: 7.23 K/UL — SIGNIFICANT CHANGE UP (ref 4.8–10.8)
WBC # FLD AUTO: 7.23 K/UL — SIGNIFICANT CHANGE UP (ref 4.8–10.8)

## 2022-06-26 PROCEDURE — 99238 HOSP IP/OBS DSCHRG MGMT 30/<: CPT

## 2022-06-26 PROCEDURE — 93010 ELECTROCARDIOGRAM REPORT: CPT

## 2022-06-26 RX ORDER — LOSARTAN POTASSIUM 100 MG/1
1 TABLET, FILM COATED ORAL
Qty: 30 | Refills: 2
Start: 2022-06-26 | End: 2022-09-23

## 2022-06-26 RX ORDER — AMLODIPINE BESYLATE 2.5 MG/1
1 TABLET ORAL
Qty: 30 | Refills: 1
Start: 2022-06-26 | End: 2022-08-24

## 2022-06-26 RX ORDER — METOPROLOL TARTRATE 50 MG
1 TABLET ORAL
Qty: 30 | Refills: 1
Start: 2022-06-26 | End: 2022-08-24

## 2022-06-26 RX ORDER — ATORVASTATIN CALCIUM 80 MG/1
1 TABLET, FILM COATED ORAL
Qty: 30 | Refills: 2
Start: 2022-06-26 | End: 2022-09-23

## 2022-06-26 RX ORDER — ASPIRIN/CALCIUM CARB/MAGNESIUM 324 MG
1 TABLET ORAL
Qty: 0 | Refills: 0 | DISCHARGE
Start: 2022-06-26

## 2022-06-26 RX ORDER — LOSARTAN POTASSIUM 100 MG/1
25 TABLET, FILM COATED ORAL DAILY
Refills: 0 | Status: DISCONTINUED | OUTPATIENT
Start: 2022-06-26 | End: 2022-06-26

## 2022-06-26 RX ORDER — AMLODIPINE BESYLATE 2.5 MG/1
1 TABLET ORAL
Qty: 0 | Refills: 0 | DISCHARGE

## 2022-06-26 RX ORDER — CLOPIDOGREL BISULFATE 75 MG/1
1 TABLET, FILM COATED ORAL
Qty: 30 | Refills: 1
Start: 2022-06-26 | End: 2022-08-24

## 2022-06-26 RX ORDER — CLOPIDOGREL BISULFATE 75 MG/1
1 TABLET, FILM COATED ORAL
Qty: 0 | Refills: 0 | DISCHARGE

## 2022-06-26 RX ADMIN — AMLODIPINE BESYLATE 10 MILLIGRAM(S): 2.5 TABLET ORAL at 05:36

## 2022-06-26 RX ADMIN — LOSARTAN POTASSIUM 25 MILLIGRAM(S): 100 TABLET, FILM COATED ORAL at 11:27

## 2022-06-26 RX ADMIN — Medication 81 MILLIGRAM(S): at 11:27

## 2022-06-26 RX ADMIN — CLOPIDOGREL BISULFATE 75 MILLIGRAM(S): 75 TABLET, FILM COATED ORAL at 11:27

## 2022-06-26 RX ADMIN — Medication 12.5 MILLIGRAM(S): at 05:36

## 2022-06-26 NOTE — DISCHARGE NOTE PROVIDER - NSDCFUADDINST_GEN_ALL_CORE_FT
- TAKE aspirin 81mg and plavix 75mg daily, DO NOT STOP, unless discussed with your cardiologist  -START taking atorvastatin 80mg daily , toprolo XL 50mg daily   - No heavy lifting for 2 weeks. ( nothing greater then 10 lbs)  - May shower 24 hours after procedure  - No Driving for 2 days   - Monitor access site for bleeding or signs of infection  - Call Cardiologist for follow up appointment upon discharge  - Discuss benefits of Cardiac Rehab with Cardiologist on follow up    - TAKE aspirin 81mg and plavix 75mg daily, DO NOT STOP, unless discussed with your cardiologist  -START taking atorvastatin 80mg daily , Toprol XL 25 mg daily, Losartan 25mg   -STOP TAKING Hydrochlorothiazide, and followup with cardiologist in 2-3 weeks to evaluate need to restart  - No heavy lifting for 2 weeks. ( nothing greater then 10 lbs)  - May shower 24 hours after procedure  - No Driving for 2 days   - Monitor access site for bleeding or signs of infection  - Call Cardiologist for follow up appointment upon discharge  - Discuss benefits of Cardiac Rehab with Cardiologist on follow up

## 2022-06-26 NOTE — DISCHARGE NOTE NURSING/CASE MANAGEMENT/SOCIAL WORK - PATIENT PORTAL LINK FT
You can access the FollowMyHealth Patient Portal offered by [facility] by registering at the following website: [email or web address]. By joining [employer]’s FollowMyHealth portal, you will also be able to view your health information using other applications (apps) compatible with our system.

## 2022-06-26 NOTE — DISCHARGE NOTE PROVIDER - HOSPITAL COURSE
Patient is a 62 yo female with history of HTN, PAD s/p femoraltibial bypass, active tobacco use 1 PPD, who presented with c/o Chest pain. NSTEMI ruled in by elevated Troponin 0.22->0.36->0.39, EKG NSR , nonischemic. On 6/25th, pt underwent LHC which shows significant 2 vessel coronary artery disease ( RCA, LCX)  s/p PCI  with JOSE x1 to proximal Lcx (3.0 X 20 Synergy XD). ECHO done with EF 50-55% with moderate MR. Patient was monitored overnight, hemodynamically stable. EKG . (R) radial access site C/D/I. No hematoma, no active bleeding. Patient will be discharged home on following medications; ASA 81MG, plavix 75mg ( pt was unable to tolerate brilinta), toprol xl 25 mg and atorvastatin 80mg and continue other home meds. Smoking cessation education provided. Patient is a 60 yo female with history of HTN, PAD s/p femoraltibial bypass, active tobacco use 1 PPD, who presented with c/o Chest pain. NSTEMI ruled in by elevated Troponin 0.22->0.36->0.39, EKG NSR , nonischemic. On 6/25th, pt underwent LHC which shows significant 2 vessel coronary artery disease ( RCA, LCX)  s/p PCI  with JOSE x1 to proximal Lcx (3.0 X 20 Synergy XD). ECHO done with EF 50-55%. Patient was monitored overnight, hemodynamically stable. (R) radial access site C/D/I. No hematoma, no active bleeding. Patient will be discharged home on following medications; ASA 81MG, plavix 75mg ( pt was unable to tolerate brilinta), toprol xl 25 mg, losartan 25mg and atorvastatin 80mg. Smoking cessation education provided.

## 2022-06-26 NOTE — DISCHARGE NOTE PROVIDER - NSDCCPCAREPLAN_GEN_ALL_CORE_FT
PRINCIPAL DISCHARGE DIAGNOSIS  Diagnosis: CAD S/P percutaneous coronary angioplasty  Assessment and Plan of Treatment: Coronary artery disease is a condition that puts you at risk for heart attack and other forms of heart disease. In people who have coronoary artery disease, the arteries that supply blood to the heart get clogged with fatty deposits.   Some people with coronary artery disease have chest pain, which is why you underwent a procedure called cardiac cathererization.   Which showed 2 vesssel CAD Disease , and a stent was put in the clogged artery.   Take Plavix 75mg and Aspirin 81mg  daily consistently, do not STOP without speaking/discussing with your cardiologist first.         SECONDARY DISCHARGE DIAGNOSES  Diagnosis: HTN (hypertension)  Assessment and Plan of Treatment: Monitor blood pressure atleast twice daily and keep record for every PCP visit . continue to take home medications    Diagnosis: HLD (hyperlipidemia)  Assessment and Plan of Treatment: START taking atorvastatin 80mg daily.  diet and lifestyle modification    Diagnosis: Current smoker  Assessment and Plan of Treatment: education on smoking cessation provided     PRINCIPAL DISCHARGE DIAGNOSIS  Diagnosis: CAD S/P percutaneous coronary angioplasty  Assessment and Plan of Treatment: Coronary artery disease is a condition that puts you at risk for heart attack and other forms of heart disease. In people who have coronoary artery disease, the arteries that supply blood to the heart get clogged with fatty deposits.   Some people with coronary artery disease have chest pain, which is why you underwent a procedure called cardiac cathererization.   Which showed 2 vesssel CAD Disease , and a stent was put in the clogged artery.   Take Plavix 75mg and Aspirin 81mg  daily consistently, do not STOP without speaking/discussing with your cardiologist first.         SECONDARY DISCHARGE DIAGNOSES  Diagnosis: HTN (hypertension)  Assessment and Plan of Treatment: Monitor blood pressure atleast twice daily and keep record for every PCP visit    Diagnosis: HLD (hyperlipidemia)  Assessment and Plan of Treatment: START taking atorvastatin 80mg daily.  diet and lifestyle modification    Diagnosis: Current smoker  Assessment and Plan of Treatment: education on smoking cessation provided

## 2022-06-26 NOTE — DISCHARGE NOTE PROVIDER - CARE PROVIDER_API CALL
Karthikeyan Mejias)  Cardiovascular Disease; Internal Medicine; Interventional Cardiology; Nuclear Cardiology  [address] [place] [address], [place] [address]  Phone: [phone]  Fax: [phone]  Follow Up Time: 2 weeks

## 2022-06-26 NOTE — DISCHARGE NOTE NURSING/CASE MANAGEMENT/SOCIAL WORK - NSDCPEFALRISK_GEN_ALL_CORE
For information on Fall & Injury Prevention, visit: [email or web address] OR  [email or web address] OR  https://www.cdc.gov/steadi/patient.html

## 2022-06-26 NOTE — DISCHARGE NOTE PROVIDER - NSDCCPTREATMENT_GEN_ALL_CORE_FT
PRINCIPAL PROCEDURE  Procedure: Percutaneous coronary intervention (PCI) with insertion of stent into left circumflex coronary artery  Findings and Treatment:

## 2022-06-26 NOTE — DISCHARGE NOTE PROVIDER - NSDCMRMEDTOKEN_GEN_ALL_CORE_FT
amLODIPine 10 mg oral tablet: 1 tab(s) orally once a day  aspirin 81 mg oral tablet, chewable: 1 tab(s) orally once a day  atorvastatin 80 mg oral tablet: 1 tab(s) orally once a day (at bedtime)  hydroCHLOROthiazide 12.5 mg oral capsule: 1 cap(s) orally once a day  Plavix 75 mg oral tablet: 1 tab(s) orally once a day   amLODIPine 5 mg oral tablet: 1 tab(s) orally once a day   aspirin 81 mg oral tablet, chewable: 1 tab(s) orally once a day  atorvastatin 80 mg oral tablet: 1 tab(s) orally once a day (at bedtime)  losartan 25 mg oral tablet: 1 tab(s) orally once a day  Plavix 75 mg oral tablet: 1 tab(s) orally once a day  Toprol-XL 25 mg oral tablet, extended release: 1 tab(s) orally once a day

## 2022-06-27 LAB
HCV AB S/CO SERPL IA: 0.04 COI — SIGNIFICANT CHANGE UP
HCV AB SERPL-IMP: SIGNIFICANT CHANGE UP

## 2022-06-30 PROBLEM — Z00.00 ENCOUNTER FOR PREVENTIVE HEALTH EXAMINATION: Status: ACTIVE | Noted: 2022-06-30

## 2022-06-30 PROBLEM — I10 ESSENTIAL (PRIMARY) HYPERTENSION: Chronic | Status: ACTIVE | Noted: 2022-06-24

## 2022-06-30 PROBLEM — I73.9 PERIPHERAL VASCULAR DISEASE, UNSPECIFIED: Chronic | Status: ACTIVE | Noted: 2022-06-24

## 2022-07-01 DIAGNOSIS — Z95.820 PERIPHERAL VASCULAR ANGIOPLASTY STATUS WITH IMPLANTS AND GRAFTS: ICD-10-CM

## 2022-07-01 DIAGNOSIS — F17.200 NICOTINE DEPENDENCE, UNSPECIFIED, UNCOMPLICATED: ICD-10-CM

## 2022-07-01 DIAGNOSIS — I25.82 CHRONIC TOTAL OCCLUSION OF CORONARY ARTERY: ICD-10-CM

## 2022-07-01 DIAGNOSIS — I10 ESSENTIAL (PRIMARY) HYPERTENSION: ICD-10-CM

## 2022-07-01 DIAGNOSIS — E78.5 HYPERLIPIDEMIA, UNSPECIFIED: ICD-10-CM

## 2022-07-01 DIAGNOSIS — I21.4 NON-ST ELEVATION (NSTEMI) MYOCARDIAL INFARCTION: ICD-10-CM

## 2022-07-01 DIAGNOSIS — R07.9 CHEST PAIN, UNSPECIFIED: ICD-10-CM

## 2022-07-01 DIAGNOSIS — I25.10 ATHEROSCLEROTIC HEART DISEASE OF NATIVE CORONARY ARTERY WITHOUT ANGINA PECTORIS: ICD-10-CM

## 2022-07-01 DIAGNOSIS — I73.9 PERIPHERAL VASCULAR DISEASE, UNSPECIFIED: ICD-10-CM

## 2022-07-22 ENCOUNTER — APPOINTMENT (OUTPATIENT)
Dept: CARDIOLOGY | Facility: CLINIC | Age: 62
End: 2022-07-22

## 2022-07-22 VITALS
BODY MASS INDEX: 28.27 KG/M2 | HEART RATE: 83 BPM | SYSTOLIC BLOOD PRESSURE: 148 MMHG | WEIGHT: 144 LBS | DIASTOLIC BLOOD PRESSURE: 78 MMHG | HEIGHT: 60 IN

## 2022-07-22 DIAGNOSIS — Z78.9 OTHER SPECIFIED HEALTH STATUS: ICD-10-CM

## 2022-07-22 DIAGNOSIS — Z82.49 FAMILY HISTORY OF ISCHEMIC HEART DISEASE AND OTHER DISEASES OF THE CIRCULATORY SYSTEM: ICD-10-CM

## 2022-07-22 DIAGNOSIS — F17.200 NICOTINE DEPENDENCE, UNSPECIFIED, UNCOMPLICATED: ICD-10-CM

## 2022-07-22 DIAGNOSIS — Z86.79 PERSONAL HISTORY OF OTHER DISEASES OF THE CIRCULATORY SYSTEM: ICD-10-CM

## 2022-07-22 PROCEDURE — 93000 ELECTROCARDIOGRAM COMPLETE: CPT

## 2022-07-22 PROCEDURE — 99214 OFFICE O/P EST MOD 30 MIN: CPT | Mod: 25

## 2022-07-22 RX ORDER — ASPIRIN ENTERIC COATED TABLETS 81 MG 81 MG/1
81 TABLET, DELAYED RELEASE ORAL DAILY
Qty: 90 | Refills: 3 | Status: ACTIVE | COMMUNITY
Start: 2022-07-22 | End: 1900-01-01

## 2022-07-22 NOTE — ASSESSMENT
[FreeTextEntry1] : 61 year Old Female \par \par HTN/ HL/ CAD/ S/p Stent Placement \par \par -Cont Amlodipine, ASA, Atorvastatin\par -cont Plavix,  Losartan\par -change metoprolol from AM to HS \par -Exercise Stress Echo \par -Hgb A1c, CBC, CMP, TSH\par -Smoking cessation\par -Aggressive risk modifications\par -Low Na, Low Fat diet\par -Activities as tolerated\par -F/u after testing \par -f/u in 3 months \par \par ATT NOTE\par PT SEEN AND EXAMINED WITH NP\par AGREE WITH ABOVE\par

## 2022-07-22 NOTE — PHYSICAL EXAM
[No Acute Distress] : no acute distress [Normal Venous Pressure] : normal venous pressure [No Carotid Bruit] : no carotid bruit [Normal S1, S2] : normal S1, S2 [No Rub] : no rub [No Gallop] : no gallop [Clear Lung Fields] : clear lung fields [No Respiratory Distress] : no respiratory distress  [Soft] : abdomen soft [Normal Gait] : normal gait [No Edema] : no edema [No Varicosities] : no varicosities [Moves all extremities] : moves all extremities [Normal Speech] : normal speech [Alert and Oriented] : alert and oriented [Normal memory] : normal memory [Murmur] : murmur [de-identified] : 2/6 syst M

## 2022-07-22 NOTE — REVIEW OF SYSTEMS
[Negative] : Heme/Lymph [Feeling Fatigued] : feeling fatigued [SOB] : no shortness of breath [Dyspnea on exertion] : not dyspnea during exertion [Chest Discomfort] : no chest discomfort [Lower Ext Edema] : no extremity edema [Leg Claudication] : no intermittent leg claudication [Palpitations] : no palpitations [Syncope] : no syncope [Cough] : cough

## 2022-07-22 NOTE — HISTORY OF PRESENT ILLNESS
[FreeTextEntry1] : Pt is 61 year old female with PMH of HTN, PAD s/p femoral-tibial bypass 25 years ago \par s/p hospitalization at [facility] with NSTEMI. S/p Catherization on 6/25/22 sign 2 vessel CAD \par RCA, LCX, S/p PCI with JOSE x 1 to prox LCX. \par TTE EF 50-55% Grade I diastolic dysfunction, Mod MVR,\par Chol 236, TRIG 220  \par Pt is a current smoker, hx of cigarette smoking 1 and 1/2 packs per day x 30 years\par Pt decreased smoking to 1/2 pack per day. \par Pt denies SOB, no chest pain \par + fatigue

## 2022-07-27 ENCOUNTER — APPOINTMENT (OUTPATIENT)
Dept: CARDIOLOGY | Facility: CLINIC | Age: 62
End: 2022-07-27

## 2022-09-15 ENCOUNTER — APPOINTMENT (OUTPATIENT)
Dept: CARDIOLOGY | Facility: CLINIC | Age: 62
End: 2022-09-15

## 2022-09-15 DIAGNOSIS — Z95.5 PRESENCE OF CORONARY ANGIOPLASTY IMPLANT AND GRAFT: ICD-10-CM

## 2022-09-15 DIAGNOSIS — I21.4 NON-ST ELEVATION (NSTEMI) MYOCARDIAL INFARCTION: ICD-10-CM

## 2022-09-15 DIAGNOSIS — E78.5 HYPERLIPIDEMIA, UNSPECIFIED: ICD-10-CM

## 2022-09-15 DIAGNOSIS — I10 ESSENTIAL (PRIMARY) HYPERTENSION: ICD-10-CM

## 2022-09-15 DIAGNOSIS — I25.10 ATHEROSCLEROTIC HEART DISEASE OF NATIVE CORONARY ARTERY W/OUT ANGINA PECTORIS: ICD-10-CM

## 2022-09-15 PROCEDURE — 93320 DOPPLER ECHO COMPLETE: CPT

## 2022-09-15 PROCEDURE — 93325 DOPPLER ECHO COLOR FLOW MAPG: CPT

## 2022-09-15 PROCEDURE — 93351 STRESS TTE COMPLETE: CPT

## 2022-09-16 PROBLEM — E78.5 DYSLIPIDEMIA: Status: ACTIVE | Noted: 2022-07-22

## 2022-09-16 PROBLEM — I21.4 NON-STEMI (NON-ST ELEVATED MYOCARDIAL INFARCTION): Status: ACTIVE | Noted: 2022-07-22

## 2022-09-16 PROBLEM — I25.10 CAD, MULTIPLE VESSEL: Status: ACTIVE | Noted: 2022-07-22

## 2022-09-16 PROBLEM — I10 BENIGN ESSENTIAL HTN: Status: ACTIVE | Noted: 2022-07-22

## 2022-09-16 PROBLEM — Z95.5 S/P CORONARY ARTERY STENT PLACEMENT: Status: ACTIVE | Noted: 2022-07-22

## 2022-10-07 ENCOUNTER — APPOINTMENT (OUTPATIENT)
Dept: CARDIOLOGY | Facility: CLINIC | Age: 62
End: 2022-10-07

## 2023-10-05 ENCOUNTER — RX RENEWAL (OUTPATIENT)
Age: 63
End: 2023-10-05

## 2023-10-05 RX ORDER — METOPROLOL SUCCINATE 50 MG/1
50 TABLET, EXTENDED RELEASE ORAL DAILY
Qty: 90 | Refills: 3 | Status: ACTIVE | COMMUNITY
Start: 2022-06-26 | End: 1900-01-01

## 2023-10-06 ENCOUNTER — RX RENEWAL (OUTPATIENT)
Age: 63
End: 2023-10-06

## 2023-10-06 RX ORDER — ASPIRIN 81 MG/1
81 TABLET, COATED ORAL
Qty: 90 | Refills: 3 | Status: ACTIVE | COMMUNITY
Start: 2023-10-06 | End: 1900-01-01

## 2023-10-09 ENCOUNTER — RX RENEWAL (OUTPATIENT)
Age: 63
End: 2023-10-09

## 2023-10-09 RX ORDER — CLOPIDOGREL BISULFATE 75 MG/1
75 TABLET, FILM COATED ORAL DAILY
Qty: 90 | Refills: 3 | Status: ACTIVE | COMMUNITY
Start: 2022-03-07 | End: 1900-01-01

## 2024-02-18 ENCOUNTER — RX RENEWAL (OUTPATIENT)
Age: 64
End: 2024-02-18

## 2024-02-18 RX ORDER — ATORVASTATIN CALCIUM 80 MG/1
80 TABLET, FILM COATED ORAL
Qty: 90 | Refills: 0 | Status: ACTIVE | COMMUNITY
Start: 2022-06-26 | End: 1900-01-01

## 2024-02-18 RX ORDER — LOSARTAN POTASSIUM 25 MG/1
25 TABLET, FILM COATED ORAL DAILY
Qty: 90 | Refills: 3 | Status: ACTIVE | COMMUNITY
Start: 2022-06-26 | End: 1900-01-01

## 2024-02-18 RX ORDER — AMLODIPINE BESYLATE 5 MG/1
5 TABLET ORAL DAILY
Qty: 90 | Refills: 0 | Status: ACTIVE | COMMUNITY
Start: 1900-01-01 | End: 1900-01-01

## 2024-05-19 ENCOUNTER — RX RENEWAL (OUTPATIENT)
Age: 64
End: 2024-05-19

## 2024-06-25 ENCOUNTER — TRANSCRIPTION ENCOUNTER (OUTPATIENT)
Age: 64
End: 2024-06-25

## 2024-06-25 ENCOUNTER — INPATIENT (INPATIENT)
Facility: HOSPITAL | Age: 64
LOS: 1 days | Discharge: HOME | DRG: 556 | End: 2024-06-27
Attending: STUDENT IN AN ORGANIZED HEALTH CARE EDUCATION/TRAINING PROGRAM | Admitting: STUDENT IN AN ORGANIZED HEALTH CARE EDUCATION/TRAINING PROGRAM
Payer: COMMERCIAL

## 2024-06-25 VITALS
RESPIRATION RATE: 20 BRPM | OXYGEN SATURATION: 96 % | DIASTOLIC BLOOD PRESSURE: 100 MMHG | TEMPERATURE: 97 F | SYSTOLIC BLOOD PRESSURE: 176 MMHG | WEIGHT: 139.99 LBS | HEART RATE: 102 BPM

## 2024-06-25 DIAGNOSIS — R29.898 OTHER SYMPTOMS AND SIGNS INVOLVING THE MUSCULOSKELETAL SYSTEM: ICD-10-CM

## 2024-06-25 DIAGNOSIS — Z98.890 OTHER SPECIFIED POSTPROCEDURAL STATES: Chronic | ICD-10-CM

## 2024-06-25 LAB
ALBUMIN SERPL ELPH-MCNC: 4.4 G/DL — SIGNIFICANT CHANGE UP (ref 3.5–5.2)
ALP SERPL-CCNC: 147 U/L — HIGH (ref 30–115)
ALT FLD-CCNC: 22 U/L — SIGNIFICANT CHANGE UP (ref 0–41)
ANION GAP SERPL CALC-SCNC: 14 MMOL/L — SIGNIFICANT CHANGE UP (ref 7–14)
APTT BLD: 75.9 SEC — CRITICAL HIGH (ref 27–39.2)
AST SERPL-CCNC: 14 U/L — SIGNIFICANT CHANGE UP (ref 0–41)
BASOPHILS # BLD AUTO: 0.04 K/UL — SIGNIFICANT CHANGE UP (ref 0–0.2)
BASOPHILS NFR BLD AUTO: 0.4 % — SIGNIFICANT CHANGE UP (ref 0–1)
BILIRUB SERPL-MCNC: 0.3 MG/DL — SIGNIFICANT CHANGE UP (ref 0.2–1.2)
BUN SERPL-MCNC: 21 MG/DL — HIGH (ref 10–20)
CALCIUM SERPL-MCNC: 11.4 MG/DL — HIGH (ref 8.4–10.5)
CHLORIDE SERPL-SCNC: 101 MMOL/L — SIGNIFICANT CHANGE UP (ref 98–110)
CO2 SERPL-SCNC: 24 MMOL/L — SIGNIFICANT CHANGE UP (ref 17–32)
CREAT SERPL-MCNC: 1 MG/DL — SIGNIFICANT CHANGE UP (ref 0.7–1.5)
EGFR: 64 ML/MIN/1.73M2 — SIGNIFICANT CHANGE UP
EOSINOPHIL # BLD AUTO: 0.02 K/UL — SIGNIFICANT CHANGE UP (ref 0–0.7)
EOSINOPHIL NFR BLD AUTO: 0.2 % — SIGNIFICANT CHANGE UP (ref 0–8)
GLUCOSE BLDC GLUCOMTR-MCNC: 169 MG/DL — HIGH (ref 70–99)
GLUCOSE SERPL-MCNC: 149 MG/DL — HIGH (ref 70–99)
HCT VFR BLD CALC: 46.4 % — SIGNIFICANT CHANGE UP (ref 37–47)
HGB BLD-MCNC: 15.9 G/DL — SIGNIFICANT CHANGE UP (ref 12–16)
IMM GRANULOCYTES NFR BLD AUTO: 0.9 % — HIGH (ref 0.1–0.3)
INR BLD: 1.1 RATIO — SIGNIFICANT CHANGE UP (ref 0.65–1.3)
LYMPHOCYTES # BLD AUTO: 1.39 K/UL — SIGNIFICANT CHANGE UP (ref 1.2–3.4)
LYMPHOCYTES # BLD AUTO: 13.6 % — LOW (ref 20.5–51.1)
MCHC RBC-ENTMCNC: 29.6 PG — SIGNIFICANT CHANGE UP (ref 27–31)
MCHC RBC-ENTMCNC: 34.3 G/DL — SIGNIFICANT CHANGE UP (ref 32–37)
MCV RBC AUTO: 86.2 FL — SIGNIFICANT CHANGE UP (ref 81–99)
MONOCYTES # BLD AUTO: 0.63 K/UL — HIGH (ref 0.1–0.6)
MONOCYTES NFR BLD AUTO: 6.2 % — SIGNIFICANT CHANGE UP (ref 1.7–9.3)
NEUTROPHILS # BLD AUTO: 8.03 K/UL — HIGH (ref 1.4–6.5)
NEUTROPHILS NFR BLD AUTO: 78.7 % — HIGH (ref 42.2–75.2)
NRBC # BLD: 0 /100 WBCS — SIGNIFICANT CHANGE UP (ref 0–0)
PLATELET # BLD AUTO: 347 K/UL — SIGNIFICANT CHANGE UP (ref 130–400)
PMV BLD: 9.6 FL — SIGNIFICANT CHANGE UP (ref 7.4–10.4)
POTASSIUM SERPL-MCNC: 4.3 MMOL/L — SIGNIFICANT CHANGE UP (ref 3.5–5)
POTASSIUM SERPL-SCNC: 4.3 MMOL/L — SIGNIFICANT CHANGE UP (ref 3.5–5)
PROT SERPL-MCNC: 7.8 G/DL — SIGNIFICANT CHANGE UP (ref 6–8)
PROTHROM AB SERPL-ACNC: 12.6 SEC — SIGNIFICANT CHANGE UP (ref 9.95–12.87)
RBC # BLD: 5.38 M/UL — SIGNIFICANT CHANGE UP (ref 4.2–5.4)
RBC # FLD: 12.7 % — SIGNIFICANT CHANGE UP (ref 11.5–14.5)
SODIUM SERPL-SCNC: 139 MMOL/L — SIGNIFICANT CHANGE UP (ref 135–146)
TROPONIN T, HIGH SENSITIVITY RESULT: 26 NG/L — HIGH (ref 6–13)
WBC # BLD: 10.2 K/UL — SIGNIFICANT CHANGE UP (ref 4.8–10.8)
WBC # FLD AUTO: 10.2 K/UL — SIGNIFICANT CHANGE UP (ref 4.8–10.8)

## 2024-06-25 PROCEDURE — 85303 CLOT INHIBIT PROT C ACTIVITY: CPT

## 2024-06-25 PROCEDURE — 82962 GLUCOSE BLOOD TEST: CPT

## 2024-06-25 PROCEDURE — 70551 MRI BRAIN STEM W/O DYE: CPT | Mod: MC

## 2024-06-25 PROCEDURE — 93005 ELECTROCARDIOGRAM TRACING: CPT

## 2024-06-25 PROCEDURE — 99285 EMERGENCY DEPT VISIT HI MDM: CPT

## 2024-06-25 PROCEDURE — 86146 BETA-2 GLYCOPROTEIN ANTIBODY: CPT

## 2024-06-25 PROCEDURE — 84443 ASSAY THYROID STIM HORMONE: CPT

## 2024-06-25 PROCEDURE — 83090 ASSAY OF HOMOCYSTEINE: CPT

## 2024-06-25 PROCEDURE — 70547 MR ANGIOGRAPHY NECK W/O DYE: CPT | Mod: MC

## 2024-06-25 PROCEDURE — 70496 CT ANGIOGRAPHY HEAD: CPT | Mod: 26,MC

## 2024-06-25 PROCEDURE — 70450 CT HEAD/BRAIN W/O DYE: CPT | Mod: 26,59,MC

## 2024-06-25 PROCEDURE — 80053 COMPREHEN METABOLIC PANEL: CPT

## 2024-06-25 PROCEDURE — 70544 MR ANGIOGRAPHY HEAD W/O DYE: CPT | Mod: MC

## 2024-06-25 PROCEDURE — 92610 EVALUATE SWALLOWING FUNCTION: CPT | Mod: GN

## 2024-06-25 PROCEDURE — 0042T: CPT | Mod: MC

## 2024-06-25 PROCEDURE — 81240 F2 GENE: CPT

## 2024-06-25 PROCEDURE — 93306 TTE W/DOPPLER COMPLETE: CPT

## 2024-06-25 PROCEDURE — 85300 ANTITHROMBIN III ACTIVITY: CPT

## 2024-06-25 PROCEDURE — 80048 BASIC METABOLIC PNL TOTAL CA: CPT

## 2024-06-25 PROCEDURE — 80061 LIPID PANEL: CPT

## 2024-06-25 PROCEDURE — 85027 COMPLETE CBC AUTOMATED: CPT

## 2024-06-25 PROCEDURE — 86147 CARDIOLIPIN ANTIBODY EA IG: CPT

## 2024-06-25 PROCEDURE — 97166 OT EVAL MOD COMPLEX 45 MIN: CPT | Mod: GO

## 2024-06-25 PROCEDURE — 85306 CLOT INHIBIT PROT S FREE: CPT

## 2024-06-25 PROCEDURE — 83036 HEMOGLOBIN GLYCOSYLATED A1C: CPT

## 2024-06-25 PROCEDURE — 84484 ASSAY OF TROPONIN QUANT: CPT

## 2024-06-25 PROCEDURE — 36415 COLL VENOUS BLD VENIPUNCTURE: CPT

## 2024-06-25 PROCEDURE — 81241 F5 GENE: CPT

## 2024-06-25 PROCEDURE — 97161 PT EVAL LOW COMPLEX 20 MIN: CPT | Mod: GP

## 2024-06-25 PROCEDURE — 85613 RUSSELL VIPER VENOM DILUTED: CPT

## 2024-06-25 PROCEDURE — 85730 THROMBOPLASTIN TIME PARTIAL: CPT

## 2024-06-25 PROCEDURE — 70498 CT ANGIOGRAPHY NECK: CPT | Mod: 26,MC

## 2024-06-25 PROCEDURE — 85307 ASSAY ACTIVATED PROTEIN C: CPT

## 2024-06-25 RX ORDER — ASPIRIN 325 MG/1
243 TABLET, FILM COATED ORAL ONCE
Refills: 0 | Status: COMPLETED | OUTPATIENT
Start: 2024-06-25 | End: 2024-06-25

## 2024-06-25 RX ORDER — CLOPIDOGREL BISULFATE 75 MG/1
75 TABLET, FILM COATED ORAL DAILY
Refills: 0 | Status: DISCONTINUED | OUTPATIENT
Start: 2024-06-25 | End: 2024-06-26

## 2024-06-25 RX ORDER — ENOXAPARIN SODIUM 100 MG/ML
40 INJECTION SUBCUTANEOUS EVERY 24 HOURS
Refills: 0 | Status: DISCONTINUED | OUTPATIENT
Start: 2024-06-26 | End: 2024-06-27

## 2024-06-25 RX ORDER — NICOTINE POLACRILEX 2 MG/1
1 LOZENGE ORAL DAILY
Refills: 0 | Status: DISCONTINUED | OUTPATIENT
Start: 2024-06-25 | End: 2024-06-27

## 2024-06-25 RX ORDER — ATORVASTATIN CALCIUM 20 MG/1
80 TABLET, FILM COATED ORAL AT BEDTIME
Refills: 0 | Status: DISCONTINUED | OUTPATIENT
Start: 2024-06-25 | End: 2024-06-27

## 2024-06-25 RX ORDER — ENOXAPARIN SODIUM 100 MG/ML
40 INJECTION SUBCUTANEOUS EVERY 24 HOURS
Refills: 0 | Status: DISCONTINUED | OUTPATIENT
Start: 2024-06-25 | End: 2024-06-25

## 2024-06-25 RX ORDER — ASPIRIN 325 MG/1
81 TABLET, FILM COATED ORAL DAILY
Refills: 0 | Status: DISCONTINUED | OUTPATIENT
Start: 2024-06-25 | End: 2024-06-26

## 2024-06-26 ENCOUNTER — RESULT REVIEW (OUTPATIENT)
Age: 64
End: 2024-06-26

## 2024-06-26 LAB
A1C WITH ESTIMATED AVERAGE GLUCOSE RESULT: 6.7 % — HIGH (ref 4–5.6)
ANION GAP SERPL CALC-SCNC: 12 MMOL/L — SIGNIFICANT CHANGE UP (ref 7–14)
APTT BLD: 64.2 SEC — HIGH (ref 27–39.2)
BUN SERPL-MCNC: 17 MG/DL — SIGNIFICANT CHANGE UP (ref 10–20)
CALCIUM SERPL-MCNC: 10.6 MG/DL — HIGH (ref 8.4–10.5)
CHLORIDE SERPL-SCNC: 103 MMOL/L — SIGNIFICANT CHANGE UP (ref 98–110)
CHOLEST SERPL-MCNC: 248 MG/DL — HIGH
CO2 SERPL-SCNC: 28 MMOL/L — SIGNIFICANT CHANGE UP (ref 17–32)
CREAT SERPL-MCNC: 0.8 MG/DL — SIGNIFICANT CHANGE UP (ref 0.7–1.5)
EGFR: 83 ML/MIN/1.73M2 — SIGNIFICANT CHANGE UP
ESTIMATED AVERAGE GLUCOSE: 146 MG/DL — HIGH (ref 68–114)
GLUCOSE BLDC GLUCOMTR-MCNC: 144 MG/DL — HIGH (ref 70–99)
GLUCOSE BLDC GLUCOMTR-MCNC: 162 MG/DL — HIGH (ref 70–99)
GLUCOSE SERPL-MCNC: 121 MG/DL — HIGH (ref 70–99)
HCT VFR BLD CALC: 45.2 % — SIGNIFICANT CHANGE UP (ref 37–47)
HCYS SERPL-MCNC: 13 UMOL/L — SIGNIFICANT CHANGE UP
HDLC SERPL-MCNC: 40 MG/DL — LOW
HGB BLD-MCNC: 15 G/DL — SIGNIFICANT CHANGE UP (ref 12–16)
LIPID PNL WITH DIRECT LDL SERPL: 176 MG/DL — HIGH
MCHC RBC-ENTMCNC: 29.3 PG — SIGNIFICANT CHANGE UP (ref 27–31)
MCHC RBC-ENTMCNC: 33.2 G/DL — SIGNIFICANT CHANGE UP (ref 32–37)
MCV RBC AUTO: 88.3 FL — SIGNIFICANT CHANGE UP (ref 81–99)
NON HDL CHOLESTEROL: 208 MG/DL — HIGH
NRBC # BLD: 0 /100 WBCS — SIGNIFICANT CHANGE UP (ref 0–0)
PLATELET # BLD AUTO: 300 K/UL — SIGNIFICANT CHANGE UP (ref 130–400)
PMV BLD: 9.9 FL — SIGNIFICANT CHANGE UP (ref 7.4–10.4)
POTASSIUM SERPL-MCNC: 3.8 MMOL/L — SIGNIFICANT CHANGE UP (ref 3.5–5)
POTASSIUM SERPL-SCNC: 3.8 MMOL/L — SIGNIFICANT CHANGE UP (ref 3.5–5)
RBC # BLD: 5.12 M/UL — SIGNIFICANT CHANGE UP (ref 4.2–5.4)
RBC # FLD: 12.8 % — SIGNIFICANT CHANGE UP (ref 11.5–14.5)
SODIUM SERPL-SCNC: 143 MMOL/L — SIGNIFICANT CHANGE UP (ref 135–146)
TRIGL SERPL-MCNC: 165 MG/DL — HIGH
TROPONIN T, HIGH SENSITIVITY RESULT: 37 NG/L — HIGH (ref 6–13)
TROPONIN T, HIGH SENSITIVITY RESULT: 52 NG/L — CRITICAL HIGH (ref 6–13)
TROPONIN T, HIGH SENSITIVITY RESULT: 66 NG/L — CRITICAL HIGH (ref 6–13)
TSH SERPL-MCNC: 1 UIU/ML — SIGNIFICANT CHANGE UP (ref 0.27–4.2)
WBC # BLD: 9.53 K/UL — SIGNIFICANT CHANGE UP (ref 4.8–10.8)
WBC # FLD AUTO: 9.53 K/UL — SIGNIFICANT CHANGE UP (ref 4.8–10.8)

## 2024-06-26 PROCEDURE — 93306 TTE W/DOPPLER COMPLETE: CPT | Mod: 26

## 2024-06-26 PROCEDURE — 99222 1ST HOSP IP/OBS MODERATE 55: CPT

## 2024-06-26 PROCEDURE — G0452: CPT | Mod: 26

## 2024-06-26 PROCEDURE — 93010 ELECTROCARDIOGRAM REPORT: CPT

## 2024-06-26 PROCEDURE — 70544 MR ANGIOGRAPHY HEAD W/O DYE: CPT | Mod: 26,59

## 2024-06-26 PROCEDURE — 70551 MRI BRAIN STEM W/O DYE: CPT | Mod: 26

## 2024-06-26 PROCEDURE — 70547 MR ANGIOGRAPHY NECK W/O DYE: CPT | Mod: 26

## 2024-06-26 PROCEDURE — 99223 1ST HOSP IP/OBS HIGH 75: CPT

## 2024-06-26 RX ORDER — INSULIN LISPRO 100 [IU]/ML
INJECTION, SOLUTION SUBCUTANEOUS
Refills: 0 | Status: DISCONTINUED | OUTPATIENT
Start: 2024-06-26 | End: 2024-06-27

## 2024-06-26 RX ORDER — ASPIRIN 325 MG/1
81 TABLET, FILM COATED ORAL DAILY
Refills: 0 | Status: DISCONTINUED | OUTPATIENT
Start: 2024-06-26 | End: 2024-06-27

## 2024-06-26 RX ORDER — CIPROFLOXACIN AND FLUOCINOLONE ACETONIDE .75; .0625 MG/.25ML; MG/.25ML
0.25 SOLUTION AURICULAR (OTIC)
Refills: 0 | Status: DISCONTINUED | OUTPATIENT
Start: 2024-06-26 | End: 2024-06-26

## 2024-06-26 RX ORDER — DEXTROSE MONOHYDRATE 100 MG/ML
125 INJECTION, SOLUTION INTRAVENOUS ONCE
Refills: 0 | Status: DISCONTINUED | OUTPATIENT
Start: 2024-06-26 | End: 2024-06-27

## 2024-06-26 RX ORDER — DEXTROSE 30 % IN WATER 30 %
12.5 VIAL (ML) INTRAVENOUS ONCE
Refills: 0 | Status: DISCONTINUED | OUTPATIENT
Start: 2024-06-26 | End: 2024-06-27

## 2024-06-26 RX ORDER — ASPIRIN 325 MG/1
81 TABLET, FILM COATED ORAL DAILY
Refills: 0 | Status: DISCONTINUED | OUTPATIENT
Start: 2024-06-26 | End: 2024-06-26

## 2024-06-26 RX ORDER — CLOPIDOGREL BISULFATE 75 MG/1
75 TABLET, FILM COATED ORAL DAILY
Refills: 0 | Status: DISCONTINUED | OUTPATIENT
Start: 2024-06-26 | End: 2024-06-27

## 2024-06-26 RX ORDER — CLOPIDOGREL BISULFATE 75 MG/1
75 TABLET, FILM COATED ORAL DAILY
Refills: 0 | Status: DISCONTINUED | OUTPATIENT
Start: 2024-06-26 | End: 2024-06-26

## 2024-06-26 RX ORDER — GLUCAGON HYDROCHLORIDE 1 MG/ML
1 INJECTION, POWDER, FOR SOLUTION INTRAMUSCULAR; INTRAVENOUS; SUBCUTANEOUS ONCE
Refills: 0 | Status: DISCONTINUED | OUTPATIENT
Start: 2024-06-26 | End: 2024-06-27

## 2024-06-26 RX ORDER — ACETAMINOPHEN 325 MG
650 TABLET ORAL ONCE
Refills: 0 | Status: COMPLETED | OUTPATIENT
Start: 2024-06-26 | End: 2024-06-26

## 2024-06-26 RX ORDER — AMLODIPINE BESYLATE 2.5 MG/1
5 TABLET ORAL DAILY
Refills: 0 | Status: DISCONTINUED | OUTPATIENT
Start: 2024-06-26 | End: 2024-06-27

## 2024-06-26 RX ORDER — METOPROLOL TARTRATE 50 MG
50 TABLET ORAL DAILY
Refills: 0 | Status: DISCONTINUED | OUTPATIENT
Start: 2024-06-26 | End: 2024-06-27

## 2024-06-26 RX ORDER — METOPROLOL TARTRATE 50 MG
1 TABLET ORAL
Refills: 0 | DISCHARGE

## 2024-06-26 RX ORDER — LORAZEPAM 0.5 MG
1 TABLET ORAL ONCE
Refills: 0 | Status: DISCONTINUED | OUTPATIENT
Start: 2024-06-26 | End: 2024-06-26

## 2024-06-26 RX ORDER — DEXTROSE MONOHYDRATE AND SODIUM CHLORIDE 5; .3 G/100ML; G/100ML
1000 INJECTION, SOLUTION INTRAVENOUS
Refills: 0 | Status: DISCONTINUED | OUTPATIENT
Start: 2024-06-26 | End: 2024-06-27

## 2024-06-26 RX ORDER — DEXTROSE 30 % IN WATER 30 %
25 VIAL (ML) INTRAVENOUS ONCE
Refills: 0 | Status: DISCONTINUED | OUTPATIENT
Start: 2024-06-26 | End: 2024-06-27

## 2024-06-26 RX ORDER — OFLOXACIN OTIC 3 MG/ML
2 SOLUTION AURICULAR (OTIC)
Refills: 0 | Status: DISCONTINUED | OUTPATIENT
Start: 2024-06-26 | End: 2024-06-27

## 2024-06-26 RX ORDER — DEXTROSE 30 % IN WATER 30 %
15 VIAL (ML) INTRAVENOUS ONCE
Refills: 0 | Status: DISCONTINUED | OUTPATIENT
Start: 2024-06-26 | End: 2024-06-27

## 2024-06-26 RX ORDER — CIPROFLOXACIN AND DEXAMETHASONE 3; 1 MG/ML; MG/ML
4 SUSPENSION/ DROPS AURICULAR (OTIC)
Refills: 0 | DISCHARGE
End: 2024-07-03

## 2024-06-26 RX ADMIN — ASPIRIN 81 MILLIGRAM(S): 325 TABLET, FILM COATED ORAL at 13:51

## 2024-06-26 RX ADMIN — CLOPIDOGREL BISULFATE 75 MILLIGRAM(S): 75 TABLET, FILM COATED ORAL at 13:51

## 2024-06-26 RX ADMIN — ENOXAPARIN SODIUM 40 MILLIGRAM(S): 100 INJECTION SUBCUTANEOUS at 13:52

## 2024-06-26 RX ADMIN — Medication 650 MILLIGRAM(S): at 20:22

## 2024-06-26 RX ADMIN — INSULIN LISPRO 2: 100 INJECTION, SOLUTION SUBCUTANEOUS at 17:17

## 2024-06-26 RX ADMIN — OFLOXACIN OTIC 2 DROP(S): 3 SOLUTION AURICULAR (OTIC) at 05:41

## 2024-06-26 RX ADMIN — AMLODIPINE BESYLATE 5 MILLIGRAM(S): 2.5 TABLET ORAL at 13:51

## 2024-06-26 RX ADMIN — ASPIRIN 243 MILLIGRAM(S): 325 TABLET, FILM COATED ORAL at 00:25

## 2024-06-26 RX ADMIN — ATORVASTATIN CALCIUM 80 MILLIGRAM(S): 20 TABLET, FILM COATED ORAL at 21:03

## 2024-06-26 RX ADMIN — Medication 1 MILLIGRAM(S): at 10:11

## 2024-06-26 RX ADMIN — Medication 50 MILLIGRAM(S): at 05:41

## 2024-06-26 RX ADMIN — Medication 650 MILLIGRAM(S): at 19:52

## 2024-06-26 RX ADMIN — NICOTINE POLACRILEX 1 PATCH: 2 LOZENGE ORAL at 13:51

## 2024-06-26 RX ADMIN — NICOTINE POLACRILEX 1 PATCH: 2 LOZENGE ORAL at 19:00

## 2024-06-26 RX ADMIN — OFLOXACIN OTIC 2 DROP(S): 3 SOLUTION AURICULAR (OTIC) at 17:17

## 2024-06-27 ENCOUNTER — TRANSCRIPTION ENCOUNTER (OUTPATIENT)
Age: 64
End: 2024-06-27

## 2024-06-27 VITALS
DIASTOLIC BLOOD PRESSURE: 95 MMHG | TEMPERATURE: 98 F | SYSTOLIC BLOOD PRESSURE: 146 MMHG | HEART RATE: 72 BPM | OXYGEN SATURATION: 96 % | RESPIRATION RATE: 18 BRPM

## 2024-06-27 DIAGNOSIS — F17.200 NICOTINE DEPENDENCE, UNSPECIFIED, UNCOMPLICATED: ICD-10-CM

## 2024-06-27 LAB
ALBUMIN SERPL ELPH-MCNC: 4 G/DL — SIGNIFICANT CHANGE UP (ref 3.5–5.2)
ALP SERPL-CCNC: 114 U/L — SIGNIFICANT CHANGE UP (ref 30–115)
ALT FLD-CCNC: 23 U/L — SIGNIFICANT CHANGE UP (ref 0–41)
ANION GAP SERPL CALC-SCNC: 11 MMOL/L — SIGNIFICANT CHANGE UP (ref 7–14)
AST SERPL-CCNC: 15 U/L — SIGNIFICANT CHANGE UP (ref 0–41)
AT III ACT/NOR PPP CHRO: 105 % — SIGNIFICANT CHANGE UP (ref 85–135)
B2 GLYCOPROT1 AB SER QL: POSITIVE
BILIRUB SERPL-MCNC: 0.3 MG/DL — SIGNIFICANT CHANGE UP (ref 0.2–1.2)
BUN SERPL-MCNC: 22 MG/DL — HIGH (ref 10–20)
CALCIUM SERPL-MCNC: 9.9 MG/DL — SIGNIFICANT CHANGE UP (ref 8.4–10.5)
CARDIOLIPIN AB SER-ACNC: POSITIVE
CHLORIDE SERPL-SCNC: 102 MMOL/L — SIGNIFICANT CHANGE UP (ref 98–110)
CO2 SERPL-SCNC: 24 MMOL/L — SIGNIFICANT CHANGE UP (ref 17–32)
CREAT SERPL-MCNC: 0.8 MG/DL — SIGNIFICANT CHANGE UP (ref 0.7–1.5)
EGFR: 83 ML/MIN/1.73M2 — SIGNIFICANT CHANGE UP
GLUCOSE BLDC GLUCOMTR-MCNC: 139 MG/DL — HIGH (ref 70–99)
GLUCOSE BLDC GLUCOMTR-MCNC: 165 MG/DL — HIGH (ref 70–99)
GLUCOSE SERPL-MCNC: 127 MG/DL — HIGH (ref 70–99)
HCT VFR BLD CALC: 45.1 % — SIGNIFICANT CHANGE UP (ref 37–47)
HGB BLD-MCNC: 15.2 G/DL — SIGNIFICANT CHANGE UP (ref 12–16)
MCHC RBC-ENTMCNC: 29.3 PG — SIGNIFICANT CHANGE UP (ref 27–31)
MCHC RBC-ENTMCNC: 33.7 G/DL — SIGNIFICANT CHANGE UP (ref 32–37)
MCV RBC AUTO: 87.1 FL — SIGNIFICANT CHANGE UP (ref 81–99)
NRBC # BLD: 0 /100 WBCS — SIGNIFICANT CHANGE UP (ref 0–0)
PLATELET # BLD AUTO: 283 K/UL — SIGNIFICANT CHANGE UP (ref 130–400)
PMV BLD: 9.6 FL — SIGNIFICANT CHANGE UP (ref 7.4–10.4)
POTASSIUM SERPL-MCNC: 4 MMOL/L — SIGNIFICANT CHANGE UP (ref 3.5–5)
POTASSIUM SERPL-SCNC: 4 MMOL/L — SIGNIFICANT CHANGE UP (ref 3.5–5)
PROT SERPL-MCNC: 6.9 G/DL — SIGNIFICANT CHANGE UP (ref 6–8)
RBC # BLD: 5.18 M/UL — SIGNIFICANT CHANGE UP (ref 4.2–5.4)
RBC # FLD: 12.7 % — SIGNIFICANT CHANGE UP (ref 11.5–14.5)
SODIUM SERPL-SCNC: 137 MMOL/L — SIGNIFICANT CHANGE UP (ref 135–146)
TROPONIN T, HIGH SENSITIVITY RESULT: 30 NG/L — HIGH (ref 6–13)
WBC # BLD: 6.34 K/UL — SIGNIFICANT CHANGE UP (ref 4.8–10.8)
WBC # FLD AUTO: 6.34 K/UL — SIGNIFICANT CHANGE UP (ref 4.8–10.8)

## 2024-06-27 PROCEDURE — 99223 1ST HOSP IP/OBS HIGH 75: CPT

## 2024-06-27 PROCEDURE — 93010 ELECTROCARDIOGRAM REPORT: CPT

## 2024-06-27 PROCEDURE — 99239 HOSP IP/OBS DSCHRG MGMT >30: CPT

## 2024-06-27 PROCEDURE — 99221 1ST HOSP IP/OBS SF/LOW 40: CPT

## 2024-06-27 RX ORDER — NICOTINE POLACRILEX 2 MG/1
1 LOZENGE ORAL
Qty: 2 | Refills: 1
Start: 2024-06-27 | End: 2024-08-25

## 2024-06-27 RX ORDER — FLUTICASONE PROPIONATE 50 UG/1
1 SPRAY, METERED NASAL
Qty: 1 | Refills: 0
Start: 2024-06-27 | End: 2024-07-11

## 2024-06-27 RX ORDER — LOSARTAN POTASSIUM 100 MG/1
25 TABLET, FILM COATED ORAL DAILY
Refills: 0 | Status: DISCONTINUED | OUTPATIENT
Start: 2024-06-27 | End: 2024-06-27

## 2024-06-27 RX ORDER — LABETALOL HYDROCHLORIDE 300 MG/1
5 TABLET ORAL ONCE
Refills: 0 | Status: COMPLETED | OUTPATIENT
Start: 2024-06-27 | End: 2024-06-27

## 2024-06-27 RX ORDER — BUDESONIDE/FORMOTEROL FUMARATE 160-4.5MCG
2 HFA AEROSOL WITH ADAPTER (GRAM) INHALATION
Qty: 1 | Refills: 0
Start: 2024-06-27 | End: 2024-07-26

## 2024-06-27 RX ORDER — PREDNISONE 10 MG/1
1 TABLET ORAL
Refills: 0 | DISCHARGE
End: 2024-06-27

## 2024-06-27 RX ORDER — AMLODIPINE BESYLATE 2.5 MG/1
5 TABLET ORAL DAILY
Refills: 0 | Status: DISCONTINUED | OUTPATIENT
Start: 2024-06-27 | End: 2024-06-27

## 2024-06-27 RX ORDER — ATORVASTATIN CALCIUM 20 MG/1
1 TABLET, FILM COATED ORAL
Qty: 30 | Refills: 3
Start: 2024-06-27 | End: 2024-10-24

## 2024-06-27 RX ORDER — TIOTROPIUM BROMIDE 18 UG/1
2 CAPSULE ORAL; RESPIRATORY (INHALATION)
Qty: 1 | Refills: 0
Start: 2024-06-27 | End: 2024-07-26

## 2024-06-27 RX ORDER — AMLODIPINE BESYLATE 2.5 MG/1
1 TABLET ORAL
Qty: 30 | Refills: 3
Start: 2024-06-27 | End: 2024-10-24

## 2024-06-27 RX ORDER — AMLODIPINE BESYLATE 2.5 MG/1
5 TABLET ORAL ONCE
Refills: 0 | Status: COMPLETED | OUTPATIENT
Start: 2024-06-27 | End: 2024-06-27

## 2024-06-27 RX ADMIN — CLOPIDOGREL BISULFATE 75 MILLIGRAM(S): 75 TABLET, FILM COATED ORAL at 11:48

## 2024-06-27 RX ADMIN — NICOTINE POLACRILEX 1 PATCH: 2 LOZENGE ORAL at 11:50

## 2024-06-27 RX ADMIN — NICOTINE POLACRILEX 1 PATCH: 2 LOZENGE ORAL at 07:31

## 2024-06-27 RX ADMIN — Medication 50 MILLIGRAM(S): at 05:36

## 2024-06-27 RX ADMIN — INSULIN LISPRO 2: 100 INJECTION, SOLUTION SUBCUTANEOUS at 12:14

## 2024-06-27 RX ADMIN — AMLODIPINE BESYLATE 5 MILLIGRAM(S): 2.5 TABLET ORAL at 11:35

## 2024-06-27 RX ADMIN — AMLODIPINE BESYLATE 5 MILLIGRAM(S): 2.5 TABLET ORAL at 05:35

## 2024-06-27 RX ADMIN — LOSARTAN POTASSIUM 25 MILLIGRAM(S): 100 TABLET, FILM COATED ORAL at 07:58

## 2024-06-27 RX ADMIN — ENOXAPARIN SODIUM 40 MILLIGRAM(S): 100 INJECTION SUBCUTANEOUS at 11:49

## 2024-06-27 RX ADMIN — OFLOXACIN OTIC 2 DROP(S): 3 SOLUTION AURICULAR (OTIC) at 05:37

## 2024-06-27 RX ADMIN — LABETALOL HYDROCHLORIDE 5 MILLIGRAM(S): 300 TABLET ORAL at 06:55

## 2024-06-27 RX ADMIN — ASPIRIN 81 MILLIGRAM(S): 325 TABLET, FILM COATED ORAL at 11:49

## 2024-06-28 ENCOUNTER — NON-APPOINTMENT (OUTPATIENT)
Age: 64
End: 2024-06-28

## 2024-06-28 LAB
CARDIOLIPIN IGM SER-MCNC: 38.5 GPL — HIGH (ref 0–12.5)
CARDIOLIPIN IGM SER-MCNC: 47.5 MPL — HIGH (ref 0–12.5)
DEPRECATED CARDIOLIPIN IGA SER: 44 APL — HIGH (ref 0–12.5)
PROT S FREE PPP-ACNC: 79 % — SIGNIFICANT CHANGE UP (ref 63–140)

## 2024-06-29 LAB
B2 GLYCOPROT1 IGA SER QL: 55.1 SAU — HIGH
B2 GLYCOPROT1 IGG SER-ACNC: 10.4 SGU — SIGNIFICANT CHANGE UP
B2 GLYCOPROT1 IGM SER-ACNC: 87.3 SMU — HIGH

## 2024-07-01 LAB
DRVVT RATIO: 2.05 RATIO — HIGH (ref 0–1.21)
DRVVT SCREEN TO CONFIRM RATIO: SIGNIFICANT CHANGE UP
NORMALIZED SCT PPP-RTO: 3.05 RATIO — HIGH (ref 0–1.16)
NORMALIZED SCT PPP-RTO: ABNORMAL
PROT C ACT/NOR PPP: 137 % — HIGH (ref 65–129)
PTR INTERPRETATION: SIGNIFICANT CHANGE UP

## 2024-07-02 LAB
APCR PPP: 2.93 RATIO — SIGNIFICANT CHANGE UP
DNA PLOIDY SPEC FC-IMP: SIGNIFICANT CHANGE UP

## 2024-07-05 DIAGNOSIS — I25.10 ATHEROSCLEROTIC HEART DISEASE OF NATIVE CORONARY ARTERY WITHOUT ANGINA PECTORIS: ICD-10-CM

## 2024-07-05 DIAGNOSIS — H66.91 OTITIS MEDIA, UNSPECIFIED, RIGHT EAR: ICD-10-CM

## 2024-07-05 DIAGNOSIS — G45.9 TRANSIENT CEREBRAL ISCHEMIC ATTACK, UNSPECIFIED: ICD-10-CM

## 2024-07-05 DIAGNOSIS — Z88.0 ALLERGY STATUS TO PENICILLIN: ICD-10-CM

## 2024-07-05 DIAGNOSIS — Z95.1 PRESENCE OF AORTOCORONARY BYPASS GRAFT: ICD-10-CM

## 2024-07-05 DIAGNOSIS — Z71.6 TOBACCO ABUSE COUNSELING: ICD-10-CM

## 2024-07-05 DIAGNOSIS — I10 ESSENTIAL (PRIMARY) HYPERTENSION: ICD-10-CM

## 2024-07-05 DIAGNOSIS — Z79.82 LONG TERM (CURRENT) USE OF ASPIRIN: ICD-10-CM

## 2024-07-05 DIAGNOSIS — R20.2 PARESTHESIA OF SKIN: ICD-10-CM

## 2024-07-05 DIAGNOSIS — F17.210 NICOTINE DEPENDENCE, CIGARETTES, UNCOMPLICATED: ICD-10-CM

## 2024-07-05 DIAGNOSIS — E11.51 TYPE 2 DIABETES MELLITUS WITH DIABETIC PERIPHERAL ANGIOPATHY WITHOUT GANGRENE: ICD-10-CM

## 2024-07-05 DIAGNOSIS — R91.8 OTHER NONSPECIFIC ABNORMAL FINDING OF LUNG FIELD: ICD-10-CM

## 2024-07-05 DIAGNOSIS — J44.9 CHRONIC OBSTRUCTIVE PULMONARY DISEASE, UNSPECIFIED: ICD-10-CM

## 2024-07-05 DIAGNOSIS — Z95.820 PERIPHERAL VASCULAR ANGIOPLASTY STATUS WITH IMPLANTS AND GRAFTS: ICD-10-CM

## 2024-07-05 DIAGNOSIS — I67.1 CEREBRAL ANEURYSM, NONRUPTURED: ICD-10-CM

## 2024-07-05 DIAGNOSIS — Z79.02 LONG TERM (CURRENT) USE OF ANTITHROMBOTICS/ANTIPLATELETS: ICD-10-CM

## 2024-07-16 PROBLEM — I25.10 ATHEROSCLEROTIC HEART DISEASE OF NATIVE CORONARY ARTERY WITHOUT ANGINA PECTORIS: Chronic | Status: ACTIVE | Noted: 2024-06-26

## 2024-07-17 NOTE — CHART NOTE - NSCHARTNOTEFT_GEN_A_CORE
c/o dyspnea on Brilinta. As per Interventional cardiologist ( Dr. Mejias), Brilinta discontinued and switched back to plavix 75mg, next dose 6/26th AM. PAST MEDICAL HISTORY:  2019 novel coronavirus disease (COVID-19)     Abnormal EKG     BPH (benign prostatic hyperplasia)     Cardiomegaly     Dyspnea on exertion     Gastric ulcer     History of use of hearing aid in both ears     HLD (hyperlipidemia)     HTN (hypertension)     Hypercholesterolemia     Type 2 diabetes mellitus     Unilateral inguinal hernia, with obstruction, without gangrene, not specified as recurrent

## 2024-08-28 ENCOUNTER — APPOINTMENT (OUTPATIENT)
Dept: NEUROLOGY | Facility: CLINIC | Age: 64
End: 2024-08-28
Payer: COMMERCIAL

## 2024-08-28 VITALS
OXYGEN SATURATION: 97 % | BODY MASS INDEX: 28.66 KG/M2 | RESPIRATION RATE: 12 BRPM | WEIGHT: 146 LBS | DIASTOLIC BLOOD PRESSURE: 79 MMHG | SYSTOLIC BLOOD PRESSURE: 152 MMHG | HEART RATE: 78 BPM | HEIGHT: 60 IN

## 2024-08-28 DIAGNOSIS — I74.9 TRANSIENT CEREBRAL ISCHEMIC ATTACK, UNSPECIFIED: ICD-10-CM

## 2024-08-28 DIAGNOSIS — G45.9 TRANSIENT CEREBRAL ISCHEMIC ATTACK, UNSPECIFIED: ICD-10-CM

## 2024-08-28 PROCEDURE — 99214 OFFICE O/P EST MOD 30 MIN: CPT

## 2024-08-28 RX ORDER — AMLODIPINE BESYLATE 10 MG/1
10 TABLET ORAL DAILY
Qty: 90 | Refills: 1 | Status: ACTIVE | COMMUNITY
Start: 2024-08-28

## 2024-08-28 NOTE — PHYSICAL EXAM
[General Appearance - Alert] : alert [General Appearance - In No Acute Distress] : in no acute distress [General Appearance - Well Nourished] : well nourished [General Appearance - Well Developed] : well developed [Oriented To Time, Place, And Person] : oriented to person, place, and time [Affect] : the affect was normal [Person] : oriented to person [Place] : oriented to place [Time] : oriented to time [Naming Objects] : no difficulty naming common objects [Repeating Phrases] : no difficulty repeating a phrase [Fluency] : fluency intact [Comprehension] : comprehension intact [Cranial Nerves Optic (II)] : visual acuity intact bilaterally,  visual fields full to confrontation, pupils equal round and reactive to light [Cranial Nerves Oculomotor (III)] : extraocular motion intact [Cranial Nerves Trigeminal (V)] : facial sensation intact symmetrically [Cranial Nerves Facial (VII)] : face symmetrical [Cranial Nerves Vestibulocochlear (VIII)] : hearing was intact bilaterally [Cranial Nerves Hypoglossal (XII)] : there was no tongue deviation with protrusion [Motor Tone] : muscle tone was normal in all four extremities [Motor Strength] : muscle strength was normal in all four extremities [Sensation Tactile Decrease] : light touch was intact [Abnormal Walk] : normal gait [Coordination - Dysmetria Impaired Finger-to-Nose Bilateral] : not present [2+] : Patella left 2+

## 2024-08-28 NOTE — HISTORY OF PRESENT ILLNESS
[FreeTextEntry1] : Pt is a 64 yo F with PMhx of HTN, HLD, tobacco use (now at 0.5ppd), CAD s/p PCI on DAPT, who presents for hospital f/u. Pt had transient right sided numbness/tingling n 6/25/24. MRI brain was negative for acute ischemia. HbA1c elevated at 6.7. She was restarted on statin. Denies any recurrence of symptoms. Has not followed up with a PCP yet. She is trying to cut back on tobacco use.

## 2024-08-28 NOTE — DISCUSSION/SUMMARY
[FreeTextEntry1] : Pt is a 62 yo F with PMhx of HTN, HLD, tobacco use (now at 0.5ppd), CAD s/p PCI on DAPT, who presents for hospital f/u.   # TIA: transient right hemihypoesthesia on 6/25/24. MRI naz negative for acute ischemia, noted chronic appearing left frontal cortical infarct. MRA head/neck without acute process (1mm right can outpouching not seen on MRA). , A1c 6.7. NIHSS 0, mRS 0. - Continue DAPT () - Continue atorvastatin 80mg daily - LDL goal <70, DM optimization - Establish care with PCP - Lipid panel, CMP and A1c in 2-3 mo - Discussed importance of tobacco cessation, diet/exercise - Possible repeat MRA head in 12 mo  RTC in 7 mo [Goals and Counseling] : I have reviewed the goals of stroke risk factor modification. I counseled the patient on measures to reduce stroke risk, including the importance of medication compliance, risk factor control, exercise, healthy diet and avoidance of smoking. I reviewed stroke warning signs and symptoms and appropriate actions to take if such occur.

## 2025-05-01 ENCOUNTER — RX RENEWAL (OUTPATIENT)
Age: 65
End: 2025-05-01